# Patient Record
Sex: MALE | Race: WHITE | NOT HISPANIC OR LATINO | Employment: OTHER | URBAN - METROPOLITAN AREA
[De-identification: names, ages, dates, MRNs, and addresses within clinical notes are randomized per-mention and may not be internally consistent; named-entity substitution may affect disease eponyms.]

---

## 2017-11-28 ENCOUNTER — TELEPHONE (OUTPATIENT)
Dept: FAMILY MEDICINE CLINIC | Facility: CLINIC | Age: 82
End: 2017-11-28

## 2017-11-28 ENCOUNTER — OFFICE VISIT (OUTPATIENT)
Dept: FAMILY MEDICINE CLINIC | Facility: CLINIC | Age: 82
End: 2017-11-28

## 2017-11-28 VITALS
BODY MASS INDEX: 28.85 KG/M2 | OXYGEN SATURATION: 98 % | SYSTOLIC BLOOD PRESSURE: 102 MMHG | DIASTOLIC BLOOD PRESSURE: 70 MMHG | WEIGHT: 213 LBS | HEART RATE: 70 BPM | TEMPERATURE: 97.7 F | HEIGHT: 72 IN

## 2017-11-28 DIAGNOSIS — Z95.0 PACEMAKER: ICD-10-CM

## 2017-11-28 DIAGNOSIS — G47.10 EXCESSIVE SLEEPINESS: ICD-10-CM

## 2017-11-28 DIAGNOSIS — R21 RASH: ICD-10-CM

## 2017-11-28 DIAGNOSIS — R53.81 PHYSICAL DEBILITY: Primary | ICD-10-CM

## 2017-11-28 DIAGNOSIS — I25.10 CORONARY ARTERY DISEASE INVOLVING NATIVE HEART WITHOUT ANGINA PECTORIS, UNSPECIFIED VESSEL OR LESION TYPE: ICD-10-CM

## 2017-11-28 LAB — INR PPP: 1.8 (ref 0.9–1.1)

## 2017-11-28 PROCEDURE — 36416 COLLJ CAPILLARY BLOOD SPEC: CPT | Performed by: FAMILY MEDICINE

## 2017-11-28 PROCEDURE — 99204 OFFICE O/P NEW MOD 45 MIN: CPT | Performed by: FAMILY MEDICINE

## 2017-11-28 PROCEDURE — 85610 PROTHROMBIN TIME: CPT | Performed by: FAMILY MEDICINE

## 2017-11-28 RX ORDER — WARFARIN SODIUM 5 MG/1
5 TABLET ORAL
COMMUNITY
End: 2018-06-29 | Stop reason: SDUPTHER

## 2017-11-28 RX ORDER — ACETAMINOPHEN 500 MG
500 TABLET ORAL 2 TIMES DAILY
COMMUNITY
End: 2020-09-14

## 2017-11-28 RX ORDER — VIT A/VIT C/VIT E/ZINC/COPPER 4296-226
1 CAPSULE ORAL DAILY
COMMUNITY
End: 2021-10-04

## 2017-11-28 RX ORDER — PAROXETINE 10 MG/1
10 TABLET, FILM COATED ORAL EVERY MORNING
COMMUNITY
End: 2018-02-05 | Stop reason: SDUPTHER

## 2017-11-28 RX ORDER — ENALAPRIL MALEATE 5 MG/1
5 TABLET ORAL DAILY
COMMUNITY
End: 2018-08-21 | Stop reason: SDUPTHER

## 2017-11-28 NOTE — PROGRESS NOTES
Subjective   Payam Campos is a 86 y.o. male.  Patient has presented to [facility].  He moved to [place] from [place] in October 2017.  His wife Debbie is present in the office and he is agreeable to discuss his care with her He has concerns about a rash and his progressive weakness making it difficult to walk.    Chief Complaint   Patient presents with   • Difficulty Walking     leg weakness, coming on gradually for a year        History of Present Illness    Difficulty walking  This has been an ongoing problem for the past 2-3 years.  It has been progressively worsening.  He was evaluated by neurosurgery in [place] at [facility].  The decision at that time was to pursue physical therapy and avoid surgery.  He has been doing physical therapy he reports for about 3 years.  Despite working with physical therapy he feels that he continues to become weaker.  He walks dependent of a walker and has used a wheelchair one time to leave the [facility] to get to the car to travel back to their [place].  He does not have shortness of breath, chest pain, leg pain although he used to have pain in the legs this has improved with regular Tylenol, it is awake weakness that keeps him from being able to walk further.  He does physical therapy at a facility 3 days per week.  He reports that he only occasionally does the physical therapy at home, independently, on his off days.  He in the family would like to avoid wheelchair requirement.    Rash  The rash was noted by helping hands caretaker from the facility on 5 days prior to arrival.  The rashes on his back and it was itchy but no longer is.  His wife said that they're worse no drainage or pus from the rash.  They just looked like little pink bumps, but since he has scratched them they have scabbed over.  Overall since onset it has improved significantly.  Wife notes that she did notice she did buy the wrong detergent as she usually uses  hypoallergenic.  He also has significantly dry skin especially on the lower legs and she has been scratching.  He has had some minor bleeding from the scratching that stopped easily.    Sleepiness  Wife noted that patient sleeps excessively.  He is able to sleep through the night about 12 hours.  And then he will nap periodically throughout the day.  He is able to stay awake for his physical therapy sessions and while eating, but when just sitting without any event he falls asleep.  Wondering if any of his medications make him sleepy.  He takes his paroxetine at night.    Coronary artery disease, history of myocardial infarction, pacemaker dependent  Pt previously seen by Dr. Del Angel of  cardiology. He has hx of STEMI 2005, had stents placed NSTEMI 2009 with complete heart block. Has a fib, on warfarin. Wife does meds but does not have daily dosing with her, has 5 mg tabs but reports takes different number on different days. Has dual chamber, permanent pacemaker, from [employer]. Rate controlled on metoprolol. Also on ACE-I, ASA. Was on atorvastatin per recommendations for his hyperlipidemia in setting of hx of CAD and MI but when developed leg pain and weakness was discontinued. Pt's leg pain and weakness persisted despite being off of the statin so cardiology advised to resume but pt and his family felt it should not be. The pt does not take a statin and does not want to take a statin out of fear it does effect his legs. Reported he was previously having pain and that is better, weakness continues.    A point of concern for the family is the pt's alcohol use. He drinks [place] bourbon, nightly. He has a 2 oz glass followed by a 1.5 oz glass his wife prepares for him. Pt reported that him wanting two drinks is a point that he and his wife often argue about. The family has concern that he has increased confusion and more difficulty with his walking. He does not think that the drinking is a problem  "or that it gives him more trouble with his thinking or walking.     The following portions of the patient's history were reviewed and updated as appropriate: allergies, current medications, past family history, past medical history, past social history, past surgical history and problem list.          Review of Systems   Constitutional: Positive for activity change. Negative for appetite change, fatigue and unexpected weight change.   HENT: Negative for congestion, postnasal drip, rhinorrhea, sinus pain and sore throat.    Respiratory: Negative for cough and shortness of breath.    Cardiovascular: Negative for chest pain and leg swelling.   Gastrointestinal: Positive for constipation. Negative for abdominal pain, blood in stool and diarrhea.   Genitourinary: Negative for difficulty urinating, dysuria, frequency and urgency.   Musculoskeletal: Positive for gait problem. Negative for back pain, joint swelling and myalgias.   Skin: Positive for rash.   Neurological: Negative for weakness and headaches.   Psychiatric/Behavioral: Positive for sleep disturbance. Negative for dysphoric mood.       Objective   Blood pressure 102/70, pulse 70, temperature 97.7 °F (36.5 °C), height 72\" (182.9 cm), weight 213 lb (96.6 kg), SpO2 98 %.  Physical Exam   Constitutional: He is oriented to person, place, and time. He appears well-nourished. No distress.   Eyes: Conjunctivae are normal. Right eye exhibits no discharge. Left eye exhibits no discharge.   Cardiovascular: Normal rate and regular rhythm.  Exam reveals no gallop and no friction rub.    Murmur heard.  Pulmonary/Chest: Effort normal and breath sounds normal. No respiratory distress. He has no wheezes. He has no rales.   Musculoskeletal: He exhibits no edema or deformity.   Neurological: He is alert and oriented to person, place, and time.   Sitting in wheel chair, can lift legs, lean forward without difficulty.   Skin:   Center of lower back and right shoulder blade with " isolate erythmatous papules with excoriation and overlying dark scab, no drainage or pustules. BLE with significant dryness, desquamation. Excoriation and scabbing on LLE superior pretibial surface.    Psychiatric: He has a normal mood and affect. His behavior is normal.   Vitals reviewed.      Assessment/Plan   Payam was seen today for difficulty walking.    Diagnoses and all orders for this visit:    Physical debility    Rash    Coronary artery disease involving native heart without angina pectoris, unspecified vessel or lesion type  -     POCT INR    Pacemaker  -     Ambulatory Referral to Cardiology    Excessive sleepiness      Debility  Encouraged pt to exercise daily with his recommended PT exercises. Discussed that means on days he does not go to the facility to do formal PT with the therapist. If not, he will likely progress to continue to lose his strength. Importance of using his muscles stressed.    Rash  Appears benign and to be clearing on exam from history. Thought to be contact derm, significantly dry skin on LEs. Encouraged to hydrate skin with emollient over night and lotion throughout the day. Call office if recurs or worsens and will prescribe topical steroid. Resume use of previous laundry detergent.    CAD, AV block, permanent pacemaker  Pt and wife were told they do not need a cardiologist in [place]. I told them should refer for pacemaker interrogation and management at a minimum. Will not restart statin at this time given unclear hx of myalgias from pt report and cardiology notes. Pt desires to avoid statin therapy at this time and understands he has high cholesterol. Cardiology notes reviewed from  from last two years, brought in by pt. History updated according to notes. Of note in 7/2015 visit pacemaker noted to have about 2.5 years of battery life remaining. Referred to cardiology for management of pacemaker, as he is pacemaker dependent. Most recent interrogation from notes was  8/2017: dual chamber Apace 0%, RV pace 100%, 3 episodes Vtachy, large amount of Afib, 96%, longest episode 8.5 days.    A fib  NSR on exam. Rate controlled with beta blocker. On warfarin but not sure of dosing. Sub therapeutic INR at 1.8. Wife to call with dosing so can be adjusted and follow up in one week for repeat INR.    Sleepiness  Encouraged more daytime activity so less sitting, watching TV, sedentary time. Possibly metoprolol causing sleepiness. Still able to sleep overnight. Taking paroxetine at night. No medication changes at this time.    Discussed alcohol consumption with pt and wife. Concerns from family that the alcohol consumption, nightly is effecting his cognition and physical function. Pt disagrees. Discussed significant potential risks with alcohol use including falls, confusion, bleeding but pt did not think these were significant enough to warrant decreasing his alcohol consumption. Pt does not prepare his drinks, wife does. Pt did agree to decrease to one drink nightly instead of two.

## 2017-11-29 ENCOUNTER — TELEPHONE (OUTPATIENT)
Dept: FAMILY MEDICINE CLINIC | Facility: CLINIC | Age: 82
End: 2017-11-29

## 2017-11-29 PROBLEM — I48.20 CHRONIC ATRIAL FIBRILLATION: Status: ACTIVE | Noted: 2017-11-29

## 2017-11-29 PROBLEM — E78.49 OTHER HYPERLIPIDEMIA: Status: ACTIVE | Noted: 2017-11-29

## 2017-11-29 PROBLEM — Z51.81 ANTICOAGULATION GOAL OF INR 2 TO 3: Status: ACTIVE | Noted: 2017-11-29

## 2017-11-29 PROBLEM — Z79.01 ANTICOAGULATION GOAL OF INR 2 TO 3: Status: ACTIVE | Noted: 2017-11-29

## 2017-11-29 PROBLEM — F41.9 ANXIETY: Status: ACTIVE | Noted: 2017-11-29

## 2017-11-29 RX ORDER — METOPROLOL TARTRATE 50 MG/1
75 TABLET, FILM COATED ORAL 2 TIMES DAILY
COMMUNITY
End: 2018-01-09 | Stop reason: DRUGHIGH

## 2017-11-29 NOTE — TELEPHONE ENCOUNTER
Called and spoke with patient's wife Mrs. Lewis.  She is responsible for the patient's medication.  She reported that patient takes 5 mg on Sunday Tuesday Thursday and 2.5 mg on Monday Wednesday Friday and Saturday.  He has 2.5 mg tablets at home.  This has been his consistent dose since Kathleen the middle of October per her memory.  His INR in the office was 1.8 and so dosing was increased for the week by 10%.  He will now take 5 mg on Wednesday also, which is today.  Patient's wife voiced understanding and will make adjustments.  She was able to repeat back the correct dosing schedule over the phone.  They are to present next Tuesday on December 5 for a repeat INR.

## 2017-12-05 ENCOUNTER — TELEPHONE (OUTPATIENT)
Dept: FAMILY MEDICINE CLINIC | Facility: CLINIC | Age: 82
End: 2017-12-05

## 2017-12-05 ENCOUNTER — CLINICAL SUPPORT (OUTPATIENT)
Dept: FAMILY MEDICINE CLINIC | Facility: CLINIC | Age: 82
End: 2017-12-05

## 2017-12-05 DIAGNOSIS — Z79.01 ANTICOAGULATION GOAL OF INR 2 TO 3: ICD-10-CM

## 2017-12-05 DIAGNOSIS — Z51.81 ANTICOAGULATION GOAL OF INR 2 TO 3: ICD-10-CM

## 2017-12-05 LAB — INR PPP: 2.6 (ref 0.9–1.1)

## 2017-12-05 PROCEDURE — 85610 PROTHROMBIN TIME: CPT | Performed by: FAMILY MEDICINE

## 2017-12-05 PROCEDURE — 36416 COLLJ CAPILLARY BLOOD SPEC: CPT | Performed by: FAMILY MEDICINE

## 2017-12-05 NOTE — TELEPHONE ENCOUNTER
Patient's wife called. She says that the patient has severe ear ache and usually has them once a month. She wanted to know what we could give him for that if anything.

## 2017-12-18 ENCOUNTER — OFFICE VISIT (OUTPATIENT)
Dept: FAMILY MEDICINE CLINIC | Facility: CLINIC | Age: 82
End: 2017-12-18

## 2017-12-18 ENCOUNTER — CLINICAL SUPPORT (OUTPATIENT)
Dept: FAMILY MEDICINE CLINIC | Facility: CLINIC | Age: 82
End: 2017-12-18

## 2017-12-18 DIAGNOSIS — H92.02 LEFT EAR PAIN: ICD-10-CM

## 2017-12-18 DIAGNOSIS — I48.20 CHRONIC ATRIAL FIBRILLATION (HCC): Primary | ICD-10-CM

## 2017-12-18 DIAGNOSIS — H61.23 BILATERAL IMPACTED CERUMEN: Primary | ICD-10-CM

## 2017-12-18 LAB — INR PPP: 3 (ref 0.9–1.1)

## 2017-12-18 PROCEDURE — 85610 PROTHROMBIN TIME: CPT | Performed by: FAMILY MEDICINE

## 2017-12-18 PROCEDURE — 36416 COLLJ CAPILLARY BLOOD SPEC: CPT | Performed by: FAMILY MEDICINE

## 2017-12-18 PROCEDURE — 99212 OFFICE O/P EST SF 10 MIN: CPT | Performed by: FAMILY MEDICINE

## 2017-12-18 NOTE — PROGRESS NOTES
Doroteo Campos is a 86 y.o. male.  Patient is here with complaints of increased ear wax and ear pain.    No chief complaint on file.       History of Present Illness  Increased earwax and ear pain  Patient does get a buildup of earwax intermittently.  He does not currently have ear pain but develops predominantly left ear pain every few weeks.  It seems to happen out of nowhere suddenly with pain inside the left ear.  For relief he takes a warm cloth analyze it against his ear and lays down.  His wife and he thinks that he gets a buildup of hard wax in the ears.  They are hoping to prevent these episodes in the future by keeping his ears cleaned out.  He is hard of hearing at baseline.      INR today was 3    Of note patient will establish care with cardiology in [place] in January 2018.  He has a pacemaker.    The following portions of the patient's history were reviewed and updated as appropriate: allergies, current medications and problem list.      Review of Systems   Constitutional: Negative for activity change.   HENT: Positive for ear pain. Negative for ear discharge.        Objective   There were no vitals taken for this visit.  Physical Exam   Constitutional: He appears well-nourished. No distress.   HENT:   Right Ear: External ear normal.   Left Ear: External ear normal.   Exam is following nurse performing removal of cerumen.  The left and right tympanic membranes and canals appear normal.  The membranes are not erythematous or bulging.  The inner ear landmarks are easily visualized.  There is no erythema or desquamation of the canal.   Eyes: Conjunctivae are normal. Right eye exhibits no discharge. Left eye exhibits no discharge.   Pulmonary/Chest: Effort normal. No respiratory distress.   Neurological:   Patient is in wheelchair.   Psychiatric: He has a normal mood and affect. His behavior is normal.   Vitals reviewed.      Assessment/Plan   Diagnoses and all orders for this  visit:    Bilateral impacted cerumen    Left ear pain    Recommended that they apply mineral oil are all with oil every 2-3 days to the ear canals and cover with cotton ball.  After a few hours or overnight with oil patient and his wife can rinse ear canal with warm water either at the sink or with bathing.  If this fails to improve the frequency and severity of his ear pain episodes we discussed that he can be referred to ENT for further evaluation.

## 2018-01-09 ENCOUNTER — OFFICE VISIT (OUTPATIENT)
Dept: CARDIOLOGY | Facility: CLINIC | Age: 83
End: 2018-01-09

## 2018-01-09 ENCOUNTER — CLINICAL SUPPORT NO REQUIREMENTS (OUTPATIENT)
Dept: CARDIOLOGY | Facility: CLINIC | Age: 83
End: 2018-01-09

## 2018-01-09 VITALS
BODY MASS INDEX: 27.46 KG/M2 | DIASTOLIC BLOOD PRESSURE: 80 MMHG | HEART RATE: 70 BPM | SYSTOLIC BLOOD PRESSURE: 128 MMHG | HEIGHT: 74 IN | WEIGHT: 214 LBS

## 2018-01-09 DIAGNOSIS — Z79.01 ANTICOAGULATION GOAL OF INR 2 TO 3: ICD-10-CM

## 2018-01-09 DIAGNOSIS — I25.10 CORONARY ARTERY DISEASE INVOLVING NATIVE HEART WITHOUT ANGINA PECTORIS, UNSPECIFIED VESSEL OR LESION TYPE: Chronic | ICD-10-CM

## 2018-01-09 DIAGNOSIS — Z95.0 PACEMAKER: Chronic | ICD-10-CM

## 2018-01-09 DIAGNOSIS — I48.20 CHRONIC ATRIAL FIBRILLATION (HCC): Primary | Chronic | ICD-10-CM

## 2018-01-09 DIAGNOSIS — I48.20 CHRONIC ATRIAL FIBRILLATION (HCC): Primary | ICD-10-CM

## 2018-01-09 DIAGNOSIS — Z51.81 ANTICOAGULATION GOAL OF INR 2 TO 3: ICD-10-CM

## 2018-01-09 DIAGNOSIS — E78.49 OTHER HYPERLIPIDEMIA: Chronic | ICD-10-CM

## 2018-01-09 PROCEDURE — 99204 OFFICE O/P NEW MOD 45 MIN: CPT | Performed by: INTERNAL MEDICINE

## 2018-01-09 PROCEDURE — 93000 ELECTROCARDIOGRAM COMPLETE: CPT | Performed by: INTERNAL MEDICINE

## 2018-01-09 PROCEDURE — 93280 PM DEVICE PROGR EVAL DUAL: CPT | Performed by: INTERNAL MEDICINE

## 2018-01-09 NOTE — PROGRESS NOTES
Subjective:     Encounter Date:01/09/2018      Patient ID: Payam Campos is a 86 y.o. male.    Chief Complaint:  History of Present Illness    Dear Dr. Walker,    I had the pleasure seeing this patient in the office today for initial evaluation consultation.  I appreciate the opportunity to see him.    He is a pleasant gentleman who previously had been followed by Dr. Khan: [address] [place] [address].  He has a history of chronic atrial fibrillation, sick sinus syndrome, pacemaker in place, as well as CAD with prior STEMI (2006)and stents placed in his LAD.    This pacemaker is a [employer] dual-chamber device.    In 2006 he had an ST segment elevation myocardial infarction and stent placement.  In 2009 he had non-ST segment elevation MI in the setting of complete heart block.  In July 2013 he had a stress Cardiolite performed that showed small defect in the inferolateral wall there was felt to be of very low risk study.  Ejection fraction was 64%.    Patient states he comes in here simply to get established.  He denies any cardiac symptoms.This patient denies any chest pain, pressure, tightness, squeezing, or heartburn.  This patient has not experienced any feeling of palpitations, tachycardia or heart racing and no presyncope or syncope.  This patient denies any shortness of breath at rest or with activity and has not had any wheezing.  This patient has not had any problems with unexplained nausea or vomiting. This patient has not been recently hospitalized for any reason.    Patient has had issues with lower extremity weakness and pain.  It is been concerned that this is due to statin therapy; he has had myopathy with this in the past.  He is currently off all statin therapy.  His wife is with him and she stresses that they want to make sure that they're doing everything they can keep him comfortable and they would prefer to avoid diagnostic testing unless absolutely necessary.    Patient also  drinks 2 drinks of [place] evening, apparently 2.0 ounces and then 1.5 ounces, prepared to his wife.  There is been concerned that he goes of this degree of alcohol intake that this is affecting his mental status.  He has been showing increasing signs of dementia.    The following portions of the patient's history were reviewed and updated as appropriate: allergies, current medications, past family history, past medical history, past social history, past surgical history and problem list.    Past Medical History:   Diagnosis Date   • Abnormal glucose    • Allergic rhinitis    • Anxiety disorder    • Arteriosclerosis of coronary artery    • Ataxia    • Atrial fibrillation    • Atrioventricular block, complete    • Basal cell carcinoma     left temple 11/2002, shoulder 10/2007   • CAD (coronary artery disease)    • Chest pain    • Chronic low back pain    • Conjunctivitis     left eye bacterial infection   • Constipation    • Coronary artery disease involving native heart without angina pectoris    • Depression    • Difficulty walking     leg weakness, coming on gradually for a year   • Earache, left    • Essential (primary) hypertension    • Excessive daytime sleepiness    • Falls    • Gait difficulty    • Heart disease    • High risk medication use    • Hyperlipidemia    • Hypertension    • Hypothyroidism    • Inguinal hernia    • Joint pain, knee    • Labyrinthitis    • Leg pain    • Leg weakness    • Long-term (current) use of anticoagulants    • Lumbar spondylosis    • Myocardial infarction     STEMI, 2006 NSTEMI 2009   • NSTEMI (non-ST elevated myocardial infarction) 2009   • Obesity    • Overweight    • Pacemaker     dual-chamber, [employer]   • Rash    • Skin cancer     right shoulder resected 10/07 and left temple resected 11/02   • Sleep disturbance    • SOB (shortness of breath)    • STEMI (ST elevation myocardial infarction) 2005   • Weakness     progressive, making it difficult to walk        Past Surgical History:   Procedure Laterality Date   • CORONARY ANGIOPLASTY WITH STENT PLACEMENT     • EYE SURGERY Left     tear duct   • PACEMAKER IMPLANTATION         Social History     Social History   • Marital status:      Spouse name: N/A   • Number of children: N/A   • Years of education: N/A     Occupational History   • Not on file.     Social History Main Topics   • Smoking status: Former Smoker   • Smokeless tobacco: Never Used   • Alcohol use Yes      Comment: 4 oz of bourbon nightly   • Drug use: No   • Sexual activity: Not on file     Other Topics Concern   • Not on file     Social History Narrative       Review of Systems   Constitution: Negative for chills, decreased appetite, fever and night sweats.   HENT: Negative for ear discharge, ear pain, hearing loss, nosebleeds and sore throat.    Eyes: Negative for blurred vision, double vision and pain.   Cardiovascular: Negative for cyanosis.   Respiratory: Negative for hemoptysis and sputum production.    Endocrine: Negative for cold intolerance and heat intolerance.   Hematologic/Lymphatic: Negative for adenopathy.   Skin: Negative for dry skin, itching, nail changes, rash and suspicious lesions.   Musculoskeletal: Negative for arthritis, gout, muscle cramps, muscle weakness, myalgias and neck pain.   Gastrointestinal: Negative for anorexia, bowel incontinence, constipation, diarrhea, dysphagia, hematemesis and jaundice.   Genitourinary: Negative for bladder incontinence, dysuria, flank pain, frequency, hematuria and nocturia.   Neurological: Negative for focal weakness, numbness, paresthesias and seizures.   Psychiatric/Behavioral: Negative for altered mental status, hallucinations, hypervigilance, suicidal ideas and thoughts of violence.   Allergic/Immunologic: Negative for persistent infections.         ECG 12 Lead  Date/Time: 1/9/2018 1:35 PM  Performed by: PETER MCCARTHY III  Authorized by: PETER MCCARTHY III   Comparison: compared  "with previous ECG   Similar to previous ECG  Rhythm: sinus rhythm and paced  Rate: normal  Conduction: left bundle branch block  QRS axis: left  Other: no other findings  Clinical impression: abnormal ECG               Objective:     Vitals:    01/09/18 1316 01/09/18 1320   BP: 128/78 128/80   BP Location: Right arm Left arm   Pulse: 70    Weight: 97.1 kg (214 lb)    Height: 188 cm (74\")          Physical Exam   Constitutional: He is oriented to person, place, and time. He appears well-developed and well-nourished. No distress.   HENT:   Head: Normocephalic and atraumatic.   Nose: Nose normal.   Mouth/Throat: Oropharynx is clear and moist.   Eyes: Conjunctivae and EOM are normal. Pupils are equal, round, and reactive to light. Right eye exhibits no discharge. Left eye exhibits no discharge.   Neck: Normal range of motion. Neck supple. No tracheal deviation present. No thyromegaly present.   Cardiovascular: Normal rate, regular rhythm, S1 normal, S2 normal, normal heart sounds and normal pulses.  Exam reveals no S3.    Pulmonary/Chest: Effort normal and breath sounds normal. No stridor. No respiratory distress. He exhibits no tenderness.   Abdominal: Soft. Bowel sounds are normal. He exhibits no distension and no mass. There is no tenderness. There is no rebound and no guarding.   Musculoskeletal: Normal range of motion. He exhibits no tenderness or deformity.   Lymphadenopathy:     He has no cervical adenopathy.   Neurological: He is alert and oriented to person, place, and time. He has normal reflexes.   Skin: Skin is warm and dry. No rash noted. He is not diaphoretic. No erythema.   Psychiatric: He has a normal mood and affect. Thought content normal.       Lab Review:             Performed        Assessment:          Diagnosis Plan   1. Chronic atrial fibrillation  ECG 12 Lead   2. Pacemaker  ECG 12 Lead   3. Anticoagulation goal of INR 2 to 3     4. Coronary artery disease involving native heart without angina " pectoris, unspecified vessel or lesion type  ECG 12 Lead   5. Other hyperlipidemia            Plan:       1. Atrial Fibrillation and Atrial Flutter  Assessment  • The patient has paroxysmal atrial fibrillation  • This is non-valvular in etiology  • The patient's CHADS2-VASc score is 4  • A [id number] score of 2 or more is considered a high risk for a thromboembolic event  • Warfarin prescribed    Plan  • Continue in atrial fibrillation with rate control  • Continue warfarin for antithrombotic therapy, bleeding issues discussed  • Continue beta blocker for rate control    2. Coronary Artery Disease  Assessment  • The patient has no angina    Plan  • Lifestyle modifications discussed include adhering to a heart healthy diet, maintenance of a healthy weight, medication compliance and regular monitoring of cholesterol and blood pressure    Subjective - Objective  • There has been a previous stent procedure using GENA  • Current antiplatelet therapy includes aspirin 81 mg    3.  Sick sinus syndrome-status post pacemaker placement.  History of complete heart block.  We will enroll in our device clinic  4.  Chronic anticoagulation followed by yourself  5.  We will arrange for a one year appointment as well.    Thank you very much for allowing us to participate in the care of this pleasant patient.  Please don't hesitate to call if I can be of assistance in any way.      Current Outpatient Prescriptions:   •  acetaminophen (TYLENOL) 500 MG tablet, Take 500 mg by mouth Every 6 (Six) Hours As Needed for Mild Pain ., Disp: , Rfl:   •  aspirin 81 MG tablet, Take 81 mg by mouth Daily., Disp: , Rfl:   •  docusate sodium (COLACE) 50 MG capsule, Take  by mouth 2 (Two) Times a Day., Disp: , Rfl:   •  enalapril (VASOTEC) 5 MG tablet, Take 5 mg by mouth Daily., Disp: , Rfl:   •  METOPROLOL TARTRATE PO, Take 75 mg by mouth. 1.5 in the am and 1.5 in the pm, Disp: , Rfl:   •  Multiple Vitamins-Minerals (ICAPS) capsule, Take  by mouth.,  Disp: , Rfl:   •  PARoxetine (PAXIL) 10 MG tablet, Take 10 mg by mouth Every Morning., Disp: , Rfl:   •  warfarin (COUMADIN) 5 MG tablet, Take 5 mg by mouth Daily., Disp: , Rfl:

## 2018-01-19 ENCOUNTER — CLINICAL SUPPORT (OUTPATIENT)
Dept: FAMILY MEDICINE CLINIC | Facility: CLINIC | Age: 83
End: 2018-01-19

## 2018-01-19 DIAGNOSIS — I48.20 CHRONIC A-FIB (HCC): Primary | ICD-10-CM

## 2018-01-19 LAB — INR PPP: 2.6 (ref 0.9–1.1)

## 2018-01-19 PROCEDURE — 85610 PROTHROMBIN TIME: CPT | Performed by: FAMILY MEDICINE

## 2018-01-19 PROCEDURE — 36416 COLLJ CAPILLARY BLOOD SPEC: CPT | Performed by: FAMILY MEDICINE

## 2018-01-22 PROBLEM — M62.59 ATROPHY OF MUSCLE OF MULTIPLE SITES: Status: ACTIVE | Noted: 2018-01-22

## 2018-01-22 PROBLEM — Z91.81 RISK FOR FALLS: Status: ACTIVE | Noted: 2018-01-22

## 2018-01-22 PROBLEM — R53.1 WEAKNESS: Status: ACTIVE | Noted: 2018-01-22

## 2018-02-01 ENCOUNTER — TELEPHONE (OUTPATIENT)
Dept: FAMILY MEDICINE CLINIC | Facility: CLINIC | Age: 83
End: 2018-02-01

## 2018-02-01 NOTE — TELEPHONE ENCOUNTER
Spoke with son. Just wanted to let me know the concerns the family has about the state of pt and timing of disability claim. Form will be faxed.

## 2018-02-01 NOTE — TELEPHONE ENCOUNTER
Patient's son called stating that he needs to share information about his father in reference to a disability claim they are getting starting with him insurance company. I let the patient know that he is not listed on his father release of pasha valerio with [employer], son is also a physician and understands with our policy as well as HIPPA he can not ask Dr Walker to release any information on the patient but he will give her information about the patient and this claim

## 2018-02-05 ENCOUNTER — CLINICAL SUPPORT (OUTPATIENT)
Dept: FAMILY MEDICINE CLINIC | Facility: CLINIC | Age: 83
End: 2018-02-05

## 2018-02-05 DIAGNOSIS — I48.91 ATRIAL FIBRILLATION, UNSPECIFIED TYPE (HCC): Primary | ICD-10-CM

## 2018-02-05 DIAGNOSIS — I48.20 CHRONIC ATRIAL FIBRILLATION (HCC): ICD-10-CM

## 2018-02-05 LAB — INR PPP: 2.5 (ref 0.9–1.1)

## 2018-02-05 PROCEDURE — 36416 COLLJ CAPILLARY BLOOD SPEC: CPT | Performed by: FAMILY MEDICINE

## 2018-02-05 PROCEDURE — 85610 PROTHROMBIN TIME: CPT | Performed by: FAMILY MEDICINE

## 2018-02-05 RX ORDER — PAROXETINE 10 MG/1
10 TABLET, FILM COATED ORAL EVERY MORNING
Qty: 60 TABLET | Refills: 2 | Status: SHIPPED | OUTPATIENT
Start: 2018-02-05 | End: 2018-06-29 | Stop reason: SDUPTHER

## 2018-02-22 ENCOUNTER — OFFICE VISIT (OUTPATIENT)
Dept: FAMILY MEDICINE CLINIC | Facility: CLINIC | Age: 83
End: 2018-02-22

## 2018-02-22 VITALS
HEIGHT: 74 IN | HEART RATE: 73 BPM | OXYGEN SATURATION: 93 % | WEIGHT: 214 LBS | DIASTOLIC BLOOD PRESSURE: 74 MMHG | SYSTOLIC BLOOD PRESSURE: 131 MMHG | BODY MASS INDEX: 27.46 KG/M2

## 2018-02-22 DIAGNOSIS — R53.83 FATIGUE, UNSPECIFIED TYPE: ICD-10-CM

## 2018-02-22 DIAGNOSIS — R53.1 GENERALIZED WEAKNESS: Primary | ICD-10-CM

## 2018-02-22 DIAGNOSIS — I48.20 CHRONIC ATRIAL FIBRILLATION (HCC): ICD-10-CM

## 2018-02-22 DIAGNOSIS — Z91.81 RISK FOR FALLS: ICD-10-CM

## 2018-02-22 DIAGNOSIS — R53.81 PHYSICAL DEBILITY: ICD-10-CM

## 2018-02-22 LAB
ALBUMIN SERPL-MCNC: 4 G/DL (ref 3.5–5.2)
ALBUMIN/GLOB SERPL: 1.8 G/DL
ALP SERPL-CCNC: 69 U/L (ref 39–117)
ALT SERPL-CCNC: 25 U/L (ref 1–41)
AST SERPL-CCNC: 25 U/L (ref 1–40)
BASOPHILS # BLD AUTO: 0.02 10*3/MM3 (ref 0–0.2)
BASOPHILS NFR BLD AUTO: 0.4 % (ref 0–1.5)
BILIRUB SERPL-MCNC: 1.8 MG/DL (ref 0.1–1.2)
BUN SERPL-MCNC: 20 MG/DL (ref 8–23)
BUN/CREAT SERPL: 23.5 (ref 7–25)
CALCIUM SERPL-MCNC: 9.5 MG/DL (ref 8.6–10.5)
CHLORIDE SERPL-SCNC: 103 MMOL/L (ref 98–107)
CO2 SERPL-SCNC: 23.7 MMOL/L (ref 22–29)
CREAT SERPL-MCNC: 0.85 MG/DL (ref 0.76–1.27)
EOSINOPHIL # BLD AUTO: 0.18 10*3/MM3 (ref 0–0.7)
EOSINOPHIL NFR BLD AUTO: 3.3 % (ref 0.3–6.2)
ERYTHROCYTE [DISTWIDTH] IN BLOOD BY AUTOMATED COUNT: 13.6 % (ref 11.5–14.5)
GFR SERPLBLD CREATININE-BSD FMLA CKD-EPI: 104 ML/MIN/1.73
GFR SERPLBLD CREATININE-BSD FMLA CKD-EPI: 85 ML/MIN/1.73
GLOBULIN SER CALC-MCNC: 2.2 GM/DL
GLUCOSE SERPL-MCNC: 103 MG/DL (ref 65–99)
HCT VFR BLD AUTO: 46 % (ref 40.4–52.2)
HGB BLD-MCNC: 15.9 G/DL (ref 13.7–17.6)
IMM GRANULOCYTES # BLD: 0 10*3/MM3 (ref 0–0.03)
IMM GRANULOCYTES NFR BLD: 0 % (ref 0–0.5)
INR PPP: 2.2 (ref 2–3)
LYMPHOCYTES # BLD AUTO: 0.99 10*3/MM3 (ref 0.9–4.8)
LYMPHOCYTES NFR BLD AUTO: 18.2 % (ref 19.6–45.3)
MCH RBC QN AUTO: 33.8 PG (ref 27–32.7)
MCHC RBC AUTO-ENTMCNC: 34.6 G/DL (ref 32.6–36.4)
MCV RBC AUTO: 97.7 FL (ref 79.8–96.2)
MONOCYTES # BLD AUTO: 0.5 10*3/MM3 (ref 0.2–1.2)
MONOCYTES NFR BLD AUTO: 9.2 % (ref 5–12)
NEUTROPHILS # BLD AUTO: 3.75 10*3/MM3 (ref 1.9–8.1)
NEUTROPHILS NFR BLD AUTO: 68.9 % (ref 42.7–76)
PLATELET # BLD AUTO: 132 10*3/MM3 (ref 140–500)
POTASSIUM SERPL-SCNC: 4.3 MMOL/L (ref 3.5–5.2)
PROT SERPL-MCNC: 6.2 G/DL (ref 6–8.5)
RBC # BLD AUTO: 4.71 10*6/MM3 (ref 4.6–6)
SODIUM SERPL-SCNC: 141 MMOL/L (ref 136–145)
TSH SERPL DL<=0.005 MIU/L-ACNC: 1.4 MIU/ML (ref 0.27–4.2)
VIT B12 SERPL-MCNC: 344 PG/ML (ref 211–946)
WBC # BLD AUTO: 5.44 10*3/MM3 (ref 4.5–10.7)

## 2018-02-22 PROCEDURE — 36416 COLLJ CAPILLARY BLOOD SPEC: CPT | Performed by: FAMILY MEDICINE

## 2018-02-22 PROCEDURE — 99214 OFFICE O/P EST MOD 30 MIN: CPT | Performed by: FAMILY MEDICINE

## 2018-02-22 PROCEDURE — 85610 PROTHROMBIN TIME: CPT | Performed by: FAMILY MEDICINE

## 2018-02-22 NOTE — PROGRESS NOTES
Subjective   Payam Campos is a 86 y.o. male.     Chief Complaint   Patient presents with   • Extremity Weakness        History of Present Illness  Weakness   Here for leg weakness. Feels like it has gotten worse. Arms feel a little weaker too. Not having pain, no symptoms of acute illness. Eating and drinking well. Feels like the worsened weakening happened suddenly. Likes going to PT and the therapists, then one day a few weeks ago said he did not want to go, surprising, just went two days ago, concerned for change. His wife says over the last few weeks notable change in strength. Gait less strong, he may fall with any step. She thinks he is going to need a wheel chair to get to the car and evening meals. Feels he needs more help and is at more risk now. He needs assistance during this small distances. Around home does well with the short distance. Bed to living room or bathroom. The car to the dining room is a problem. Sometimes taking breaks to get to cafeteria. Feels like at every last night used wheel chair to the dining room and went well. Thinks he lost weight but he is eating well. Doing PT twice weekly. He would like to increase to 3 times weekly.    A fib  INR today on [employer].     The following portions of the patient's history were reviewed and updated as appropriate: allergies, current medications and problem list.     Review of Systems   Constitutional: Positive for activity change. Negative for appetite change and unexpected weight change.        Thought weight loss but scale here shows stable. Says around time of move he was 220 lb.   HENT: Negative for congestion and rhinorrhea.    Respiratory: Negative for cough and shortness of breath.    Gastrointestinal: Negative for abdominal pain, diarrhea, nausea and vomiting.   Musculoskeletal: Positive for gait problem. Negative for arthralgias and myalgias.   Neurological: Positive for weakness. Negative for numbness.   Psychiatric/Behavioral:  "Negative for dysphoric mood and sleep disturbance.       Objective   Blood pressure 131/74, pulse 73, height 188 cm (74\"), weight 97.1 kg (214 lb), SpO2 93 %.  Physical Exam   Constitutional: He appears well-nourished. No distress.   Eyes: Conjunctivae are normal. No scleral icterus.   Pulmonary/Chest: Effort normal. No respiratory distress.   Musculoskeletal: He exhibits tenderness. He exhibits no edema or deformity.   Neurological: He is alert.   Reduced muscle bulk of upper legs, mild tenderness to palpation over bilateral lower legs. No knee tenderness. Stood independently from wheel chair for about 20-30 seconds before feeling the need to sit from legs getting weak. Lifts and holds flexed legs easily, 5/5 LE strength bilaterally. 5/5  and BUE strength.   Psychiatric: He has a normal mood and affect. His behavior is normal.   Vitals reviewed.      Assessment/Plan   Payam was seen today for extremity weakness.    Diagnoses and all orders for this visit:    Generalized weakness  -     TSH Rfx On Abnormal To Free T4  -     Comprehensive Metabolic Panel  -     CBC & Differential  -     Vitamin B12    Risk for falls  -     Vitamin B12    Physical debility  -     TSH Rfx On Abnormal To Free T4  -     Comprehensive Metabolic Panel  -     Vitamin B12    Fatigue, unspecified type  -     TSH Rfx On Abnormal To Free T4  -     Comprehensive Metabolic Panel  -     CBC & Differential  -     Vitamin B12    will send script for wheelchair to [facility] per request  Needs current labs especially given described sudden change in his weakness.    Encouraged pt to continue PT, will increase to 3 times weekly    Encouraged pt and wife to do the PT exercises at home on off days from the gym. They are agreeable. Pt reported needing the wheelchair and the abrupt weakness got his attention and wants to be better about his exercises.            "

## 2018-03-01 ENCOUNTER — TELEPHONE (OUTPATIENT)
Dept: FAMILY MEDICINE CLINIC | Facility: CLINIC | Age: 83
End: 2018-03-01

## 2018-03-21 ENCOUNTER — TELEPHONE (OUTPATIENT)
Dept: FAMILY MEDICINE CLINIC | Facility: CLINIC | Age: 83
End: 2018-03-21

## 2018-03-21 NOTE — TELEPHONE ENCOUNTER
Spoke with patient's wife about the patient's labs as well as suggetions per Dr Walker for his B12

## 2018-03-27 ENCOUNTER — CLINICAL SUPPORT (OUTPATIENT)
Dept: FAMILY MEDICINE CLINIC | Facility: CLINIC | Age: 83
End: 2018-03-27

## 2018-03-27 DIAGNOSIS — I48.91 ATRIAL FIBRILLATION, UNSPECIFIED TYPE (HCC): Primary | ICD-10-CM

## 2018-03-27 LAB — INR PPP: 2.4 (ref 0.9–1.1)

## 2018-03-27 PROCEDURE — 85610 PROTHROMBIN TIME: CPT | Performed by: FAMILY MEDICINE

## 2018-03-27 PROCEDURE — 36416 COLLJ CAPILLARY BLOOD SPEC: CPT | Performed by: FAMILY MEDICINE

## 2018-04-12 ENCOUNTER — CLINICAL SUPPORT NO REQUIREMENTS (OUTPATIENT)
Dept: CARDIOLOGY | Facility: CLINIC | Age: 83
End: 2018-04-12

## 2018-04-12 DIAGNOSIS — I48.20 CHRONIC ATRIAL FIBRILLATION (HCC): Primary | ICD-10-CM

## 2018-04-12 PROCEDURE — 93293 PM PHONE R-STRIP DEVICE EVAL: CPT | Performed by: INTERNAL MEDICINE

## 2018-04-27 ENCOUNTER — CLINICAL SUPPORT (OUTPATIENT)
Dept: FAMILY MEDICINE CLINIC | Facility: CLINIC | Age: 83
End: 2018-04-27

## 2018-04-27 DIAGNOSIS — I48.20 CHRONIC ATRIAL FIBRILLATION (HCC): Primary | ICD-10-CM

## 2018-04-27 LAB — INR PPP: 2.9 (ref 2–3)

## 2018-04-27 PROCEDURE — 85610 PROTHROMBIN TIME: CPT | Performed by: FAMILY MEDICINE

## 2018-04-27 PROCEDURE — 36416 COLLJ CAPILLARY BLOOD SPEC: CPT | Performed by: FAMILY MEDICINE

## 2018-05-25 ENCOUNTER — TELEPHONE (OUTPATIENT)
Dept: FAMILY MEDICINE CLINIC | Facility: CLINIC | Age: 83
End: 2018-05-25

## 2018-05-25 NOTE — TELEPHONE ENCOUNTER
Patient's wife called back with patient medication schedule Sun, Tue, Wed,Thurs he takes 5mg all other days he takes 2.5Mg his INR was 3.2 today

## 2018-05-25 NOTE — TELEPHONE ENCOUNTER
Have patient take 2.5 mg Monday, Wednesday, Friday and Saturday  Continue 5 mg on Sun, T, Th  Repeat in 2 weeks

## 2018-06-13 ENCOUNTER — TELEPHONE (OUTPATIENT)
Dept: FAMILY MEDICINE CLINIC | Facility: CLINIC | Age: 83
End: 2018-06-13

## 2018-06-13 ENCOUNTER — CLINICAL SUPPORT (OUTPATIENT)
Dept: FAMILY MEDICINE CLINIC | Facility: CLINIC | Age: 83
End: 2018-06-13

## 2018-06-13 DIAGNOSIS — I48.20 CHRONIC ATRIAL FIBRILLATION (HCC): Primary | ICD-10-CM

## 2018-06-13 LAB — INR PPP: 1.9 (ref 0.9–1.1)

## 2018-06-13 PROCEDURE — 85610 PROTHROMBIN TIME: CPT | Performed by: FAMILY MEDICINE

## 2018-06-13 PROCEDURE — 36416 COLLJ CAPILLARY BLOOD SPEC: CPT | Performed by: FAMILY MEDICINE

## 2018-06-13 NOTE — TELEPHONE ENCOUNTER
Pt notified that he need to take 5mg today instead 2.5mg by dr olguin   He was here earlier for inr check

## 2018-06-29 RX ORDER — PAROXETINE 10 MG/1
10 TABLET, FILM COATED ORAL EVERY MORNING
Qty: 90 TABLET | Refills: 1 | Status: SHIPPED | OUTPATIENT
Start: 2018-06-29 | End: 2018-08-31

## 2018-06-29 RX ORDER — WARFARIN SODIUM 5 MG/1
5 TABLET ORAL
Qty: 90 TABLET | Refills: 1 | Status: SHIPPED | OUTPATIENT
Start: 2018-06-29 | End: 2018-07-11 | Stop reason: SDUPTHER

## 2018-07-11 RX ORDER — WARFARIN SODIUM 2.5 MG/1
2.5 TABLET ORAL
Qty: 14 TABLET | Refills: 0 | Status: ON HOLD | OUTPATIENT
Start: 2018-07-11 | End: 2018-11-05 | Stop reason: ALTCHOICE

## 2018-07-12 ENCOUNTER — CLINICAL SUPPORT NO REQUIREMENTS (OUTPATIENT)
Dept: CARDIOLOGY | Facility: CLINIC | Age: 83
End: 2018-07-12

## 2018-07-12 DIAGNOSIS — I44.2 COMPLETE HEART BLOCK (HCC): Primary | ICD-10-CM

## 2018-07-13 ENCOUNTER — CLINICAL SUPPORT (OUTPATIENT)
Dept: FAMILY MEDICINE CLINIC | Facility: CLINIC | Age: 83
End: 2018-07-13

## 2018-07-13 DIAGNOSIS — E53.8 B12 DEFICIENCY: Primary | ICD-10-CM

## 2018-07-13 DIAGNOSIS — N28.9 KIDNEY FUNCTION ABNORMAL: ICD-10-CM

## 2018-07-13 DIAGNOSIS — Z79.01 ANTICOAGULANT LONG-TERM USE: Primary | ICD-10-CM

## 2018-07-13 LAB — INR PPP: 3.3 (ref 2–3)

## 2018-07-13 PROCEDURE — 85610 PROTHROMBIN TIME: CPT | Performed by: FAMILY MEDICINE

## 2018-07-13 PROCEDURE — 36416 COLLJ CAPILLARY BLOOD SPEC: CPT | Performed by: FAMILY MEDICINE

## 2018-07-13 PROCEDURE — 93793 ANTICOAG MGMT PT WARFARIN: CPT | Performed by: FAMILY MEDICINE

## 2018-07-14 LAB
BUN SERPL-MCNC: 21 MG/DL (ref 8–23)
BUN/CREAT SERPL: 22.8 (ref 7–25)
CALCIUM SERPL-MCNC: 9.3 MG/DL (ref 8.6–10.5)
CHLORIDE SERPL-SCNC: 105 MMOL/L (ref 98–107)
CO2 SERPL-SCNC: 25.1 MMOL/L (ref 22–29)
CREAT SERPL-MCNC: 0.92 MG/DL (ref 0.76–1.27)
GLUCOSE SERPL-MCNC: 90 MG/DL (ref 65–99)
POTASSIUM SERPL-SCNC: 4 MMOL/L (ref 3.5–5.2)
SODIUM SERPL-SCNC: 145 MMOL/L (ref 136–145)
VIT B12 SERPL-MCNC: 740 PG/ML (ref 211–946)

## 2018-07-17 ENCOUNTER — TELEPHONE (OUTPATIENT)
Dept: FAMILY MEDICINE CLINIC | Facility: CLINIC | Age: 83
End: 2018-07-17

## 2018-07-20 ENCOUNTER — CLINICAL SUPPORT (OUTPATIENT)
Dept: FAMILY MEDICINE CLINIC | Facility: CLINIC | Age: 83
End: 2018-07-20

## 2018-07-20 DIAGNOSIS — I48.20 CHRONIC ATRIAL FIBRILLATION (HCC): Primary | ICD-10-CM

## 2018-07-20 LAB — INR PPP: 2.9 (ref 0.9–1.1)

## 2018-07-20 PROCEDURE — 36416 COLLJ CAPILLARY BLOOD SPEC: CPT | Performed by: FAMILY MEDICINE

## 2018-07-20 PROCEDURE — 85610 PROTHROMBIN TIME: CPT | Performed by: FAMILY MEDICINE

## 2018-07-20 PROCEDURE — 93793 ANTICOAG MGMT PT WARFARIN: CPT | Performed by: FAMILY MEDICINE

## 2018-07-27 RX ORDER — METOPROLOL TARTRATE 50 MG/1
75 TABLET, FILM COATED ORAL EVERY 12 HOURS SCHEDULED
Qty: 270 TABLET | Refills: 1 | Status: SHIPPED | OUTPATIENT
Start: 2018-07-27 | End: 2019-01-10 | Stop reason: SDUPTHER

## 2018-08-20 ENCOUNTER — CLINICAL SUPPORT (OUTPATIENT)
Dept: FAMILY MEDICINE CLINIC | Facility: CLINIC | Age: 83
End: 2018-08-20

## 2018-08-20 DIAGNOSIS — I48.20 CHRONIC ATRIAL FIBRILLATION (HCC): Primary | Chronic | ICD-10-CM

## 2018-08-20 LAB — INR PPP: 2.8 (ref 0.9–1.1)

## 2018-08-20 PROCEDURE — 36416 COLLJ CAPILLARY BLOOD SPEC: CPT | Performed by: FAMILY MEDICINE

## 2018-08-20 PROCEDURE — 85610 PROTHROMBIN TIME: CPT | Performed by: FAMILY MEDICINE

## 2018-08-21 ENCOUNTER — TELEPHONE (OUTPATIENT)
Dept: FAMILY MEDICINE CLINIC | Facility: CLINIC | Age: 83
End: 2018-08-21

## 2018-08-21 RX ORDER — ENALAPRIL MALEATE 5 MG/1
5 TABLET ORAL DAILY
Qty: 90 TABLET | Refills: 1 | Status: SHIPPED | OUTPATIENT
Start: 2018-08-21 | End: 2018-10-23 | Stop reason: SDUPTHER

## 2018-08-21 NOTE — TELEPHONE ENCOUNTER
Physical therapist called stating that the patient's heart beat was irregular today while there, also stated that the patient has been really tired and weak his last few sessions. Therapist would like to be advised on what they should do as well as what the patient should do

## 2018-08-22 ENCOUNTER — CLINICAL SUPPORT NO REQUIREMENTS (OUTPATIENT)
Dept: CARDIOLOGY | Facility: CLINIC | Age: 83
End: 2018-08-22

## 2018-08-22 DIAGNOSIS — I48.20 CHRONIC ATRIAL FIBRILLATION (HCC): ICD-10-CM

## 2018-08-22 DIAGNOSIS — I44.2 COMPLETE HEART BLOCK (HCC): Primary | ICD-10-CM

## 2018-08-22 PROCEDURE — 93280 PM DEVICE PROGR EVAL DUAL: CPT | Performed by: INTERNAL MEDICINE

## 2018-08-22 NOTE — TELEPHONE ENCOUNTER
Can we please try to move up his next appointment to next week?  I think we're seeing him 10/5 but given his new symptoms let's try to get him in sooner.  Thank you so much.

## 2018-08-23 ENCOUNTER — TELEPHONE (OUTPATIENT)
Dept: FAMILY MEDICINE CLINIC | Facility: CLINIC | Age: 83
End: 2018-08-23

## 2018-08-23 NOTE — TELEPHONE ENCOUNTER
Left a detailed message for patient to return my call to try to get them in tomorrow with one of our NP

## 2018-08-29 ENCOUNTER — TELEPHONE (OUTPATIENT)
Dept: FAMILY MEDICINE CLINIC | Facility: CLINIC | Age: 83
End: 2018-08-29

## 2018-08-29 NOTE — TELEPHONE ENCOUNTER
Patient's wife called stating that his physical therapist is concerned with the patient when he comes to his sessions, he is very quiet and his heart rate will go from 62 up to116 and his oxygen  levels  goes from 97 down to 91. Not sure what they should do right now.

## 2018-08-29 NOTE — TELEPHONE ENCOUNTER
Thanks for working on getting him scheduled for a sooner appointment again.  If he has to see another provider with make it so that appointment is at least 30 minutes.  Also let me know who and when and I will touch base with them prior to his appointment.  Thank you.

## 2018-08-31 ENCOUNTER — OFFICE VISIT (OUTPATIENT)
Dept: FAMILY MEDICINE CLINIC | Facility: CLINIC | Age: 83
End: 2018-08-31

## 2018-08-31 VITALS
BODY MASS INDEX: 26.05 KG/M2 | HEART RATE: 79 BPM | OXYGEN SATURATION: 99 % | SYSTOLIC BLOOD PRESSURE: 118 MMHG | HEIGHT: 74 IN | DIASTOLIC BLOOD PRESSURE: 70 MMHG | WEIGHT: 203 LBS

## 2018-08-31 DIAGNOSIS — F32.0 MILD SINGLE CURRENT EPISODE OF MAJOR DEPRESSIVE DISORDER (HCC): Primary | ICD-10-CM

## 2018-08-31 DIAGNOSIS — R53.1 WEAKNESS: ICD-10-CM

## 2018-08-31 DIAGNOSIS — R53.81 PHYSICAL DEBILITY: ICD-10-CM

## 2018-08-31 PROCEDURE — 99214 OFFICE O/P EST MOD 30 MIN: CPT | Performed by: FAMILY MEDICINE

## 2018-08-31 RX ORDER — SERTRALINE HYDROCHLORIDE 25 MG/1
25 TABLET, FILM COATED ORAL DAILY
Qty: 30 TABLET | Refills: 0 | Status: SHIPPED | OUTPATIENT
Start: 2018-08-31 | End: 2018-09-28 | Stop reason: SDUPTHER

## 2018-08-31 RX ORDER — WARFARIN SODIUM 5 MG/1
5 TABLET ORAL DAILY
COMMUNITY
Start: 2018-07-14 | End: 2018-11-20

## 2018-08-31 RX ORDER — POLYETHYLENE GLYCOL 3350 17 G/17G
17 POWDER, FOR SOLUTION ORAL DAILY
Qty: 100 EACH | Refills: 0 | Status: SHIPPED | OUTPATIENT
Start: 2018-08-31 | End: 2019-07-22 | Stop reason: SDUPTHER

## 2018-09-26 ENCOUNTER — CLINICAL SUPPORT (OUTPATIENT)
Dept: FAMILY MEDICINE CLINIC | Facility: CLINIC | Age: 83
End: 2018-09-26

## 2018-09-26 DIAGNOSIS — I48.20 CHRONIC ATRIAL FIBRILLATION (HCC): Chronic | ICD-10-CM

## 2018-09-26 LAB — INR PPP: 4.3 (ref 0.9–1.1)

## 2018-09-26 PROCEDURE — 90662 IIV NO PRSV INCREASED AG IM: CPT | Performed by: FAMILY MEDICINE

## 2018-09-26 PROCEDURE — G0008 ADMIN INFLUENZA VIRUS VAC: HCPCS | Performed by: FAMILY MEDICINE

## 2018-09-26 PROCEDURE — 85610 PROTHROMBIN TIME: CPT | Performed by: FAMILY MEDICINE

## 2018-09-26 PROCEDURE — 36416 COLLJ CAPILLARY BLOOD SPEC: CPT | Performed by: FAMILY MEDICINE

## 2018-09-27 ENCOUNTER — CLINICAL SUPPORT NO REQUIREMENTS (OUTPATIENT)
Dept: CARDIOLOGY | Facility: CLINIC | Age: 83
End: 2018-09-27

## 2018-09-27 DIAGNOSIS — I49.5 SICK SINUS SYNDROME (HCC): Primary | ICD-10-CM

## 2018-09-28 RX ORDER — SERTRALINE HYDROCHLORIDE 25 MG/1
25 TABLET, FILM COATED ORAL DAILY
Qty: 14 TABLET | Refills: 0 | Status: SHIPPED | OUTPATIENT
Start: 2018-09-28 | End: 2019-01-11 | Stop reason: ALTCHOICE

## 2018-09-28 RX ORDER — SERTRALINE HYDROCHLORIDE 25 MG/1
25 TABLET, FILM COATED ORAL DAILY
Qty: 90 TABLET | Refills: 1 | Status: SHIPPED | OUTPATIENT
Start: 2018-09-28 | End: 2018-09-28 | Stop reason: SDUPTHER

## 2018-10-05 ENCOUNTER — OFFICE VISIT (OUTPATIENT)
Dept: FAMILY MEDICINE CLINIC | Facility: CLINIC | Age: 83
End: 2018-10-05

## 2018-10-05 VITALS
SYSTOLIC BLOOD PRESSURE: 122 MMHG | DIASTOLIC BLOOD PRESSURE: 72 MMHG | HEART RATE: 99 BPM | WEIGHT: 203 LBS | HEIGHT: 74 IN | BODY MASS INDEX: 26.05 KG/M2 | OXYGEN SATURATION: 99 %

## 2018-10-05 DIAGNOSIS — R00.0 TACHYCARDIA: ICD-10-CM

## 2018-10-05 DIAGNOSIS — Z79.01 ANTICOAGULATION GOAL OF INR 2 TO 3: Primary | Chronic | ICD-10-CM

## 2018-10-05 DIAGNOSIS — Z51.81 ANTICOAGULATION GOAL OF INR 2 TO 3: Primary | Chronic | ICD-10-CM

## 2018-10-05 DIAGNOSIS — R45.89 DEPRESSED MOOD: ICD-10-CM

## 2018-10-05 LAB — INR PPP: 2.6 (ref 0.9–1.1)

## 2018-10-05 PROCEDURE — 99214 OFFICE O/P EST MOD 30 MIN: CPT | Performed by: FAMILY MEDICINE

## 2018-10-05 PROCEDURE — 85610 PROTHROMBIN TIME: CPT | Performed by: FAMILY MEDICINE

## 2018-10-05 PROCEDURE — 36416 COLLJ CAPILLARY BLOOD SPEC: CPT | Performed by: FAMILY MEDICINE

## 2018-10-05 NOTE — PROGRESS NOTES
"Doroteo Campos is a 87 y.o. male.     Chief Complaint   Patient presents with   • Follow-up     on new medication         History of Present Illness  Depression with medication change  D/c paxil and started sertraline. Still feeling tired a lot. Appetite is fair having two meals per day. Always eats everything on his plate. He says he enjoys PT. Had visit from son this last week and reported he enjoyed. His weight is stable. He is taking milk shakes and enjoying these. Per wife thinks he has had a boost in his mood but still getting up early and then back to sleep. Showing more interest in people again and conversations. He takes sertraline at night.     Tachycardia   New problem to me. Elevated heart rate with PT, go up from 90 to 106 per wife. When this happens they slow down the activity and then can resume. He has pacemaker in place hx of sick sinus syndrome and MI with stenting, a fib. Pt reports that he has no chest pain at PT, no SOB, no palpitations.    Anticoagulation  5 mg TT, 2.5 mg all the other days. Has been elevated last couple of weeks and adjustments last week to this dosing.    The following portions of the patient's history were reviewed and updated as appropriate: allergies, current medications and problem list.    Review of Systems   Constitutional: Positive for activity change. Negative for appetite change and unexpected weight change.        Weight is unchanged.    Respiratory: Negative for shortness of breath.    Cardiovascular: Negative for chest pain and palpitations.   Psychiatric/Behavioral: Positive for sleep disturbance. Negative for dysphoric mood.       Objective   Blood pressure 122/72, pulse 99, height 188 cm (74\"), weight 92.1 kg (203 lb), SpO2 99 %.  Physical Exam   Constitutional: He is oriented to person, place, and time. He appears well-nourished. No distress.   Eyes: Conjunctivae are normal. Right eye exhibits no discharge. Left eye exhibits no discharge. " No scleral icterus.   Cardiovascular: Normal rate, regular rhythm, normal heart sounds and intact distal pulses.  Exam reveals no gallop and no friction rub.    No murmur heard.  Pulmonary/Chest: Effort normal. No respiratory distress.   Musculoskeletal: He exhibits no edema.   Neurological: He is alert and oriented to person, place, and time.   Psychiatric: He has a normal mood and affect. His behavior is normal.   Has some episodes of dozing off but always engaged when spoken too. Asked many questions regarding his sleepiness and wife's condition. Affect bright today, joking and making funny faces.   Vitals reviewed.      Assessment/Plan   Payam was seen today for follow-up.    Diagnoses and all orders for this visit:    Anticoagulation goal of INR 2 to 3  -     POCT INR  In good range today at 2.6.  We will continue the 5 mg on Tuesday and Thursday and 2.5 mg all the other day.  Depressed mood  This has improved with discontinuing the Paxil and transitioning to Zoloft.  Patient behaving differently in the office and seems to have a brighter mood.  Wife comments that things have improved and she's noticed changes since the medication transition.  Will continue same dose of Zoloft at this time.  Tachycardia  I have reviewed physical therapy documentation from his Center and there is no mention of changes in his vital signs during exercise.  Most recent form is from 6/2018. Will request more information and updated information from PT. I will also contact pt's cardiologist Dr. Rajan Borden to make aware.

## 2018-10-17 ENCOUNTER — TELEPHONE (OUTPATIENT)
Dept: FAMILY MEDICINE CLINIC | Facility: CLINIC | Age: 83
End: 2018-10-17

## 2018-10-17 NOTE — TELEPHONE ENCOUNTER
Nurse from [facility] called with the vitals on the patient. Nurse stated that the patient will sometimes have his b/p check and O2 stats, and his heart rate is checked while walking and it will range from  only when taking 15-20 steps. Patient feels tired and has SOB after taking those steps. When the patient is having the jad numbers on his heart rate it is taken manually to be accurate.

## 2018-10-23 RX ORDER — ENALAPRIL MALEATE 5 MG/1
5 TABLET ORAL DAILY
Qty: 90 TABLET | Refills: 1 | Status: SHIPPED | OUTPATIENT
Start: 2018-10-23 | End: 2018-10-31 | Stop reason: SDUPTHER

## 2018-10-25 ENCOUNTER — CLINICAL SUPPORT NO REQUIREMENTS (OUTPATIENT)
Dept: CARDIOLOGY | Facility: CLINIC | Age: 83
End: 2018-10-25

## 2018-10-25 ENCOUNTER — TELEPHONE (OUTPATIENT)
Dept: CARDIOLOGY | Facility: CLINIC | Age: 83
End: 2018-10-25

## 2018-10-25 DIAGNOSIS — I49.5 SICK SINUS SYNDROME (HCC): Primary | ICD-10-CM

## 2018-10-25 DIAGNOSIS — I45.9 HEART BLOCK: Primary | ICD-10-CM

## 2018-10-25 PROCEDURE — 93293 PM PHONE R-STRIP DEVICE EVAL: CPT | Performed by: INTERNAL MEDICINE

## 2018-10-26 PROBLEM — I45.9 HEART BLOCK: Status: ACTIVE | Noted: 2018-10-26

## 2018-10-31 ENCOUNTER — TELEPHONE (OUTPATIENT)
Dept: FAMILY MEDICINE CLINIC | Facility: CLINIC | Age: 83
End: 2018-10-31

## 2018-10-31 RX ORDER — ENALAPRIL MALEATE 5 MG/1
5 TABLET ORAL DAILY
Qty: 7 TABLET | Refills: 0 | Status: SHIPPED | OUTPATIENT
Start: 2018-10-31 | End: 2019-01-22 | Stop reason: SDUPTHER

## 2018-10-31 NOTE — TELEPHONE ENCOUNTER
Spoke with wife. Says pt is out of is enalipril starting tomorrow. Was ordered by the office 10/23/18 but not yet received by pt. Also says that pt has been taking 1.5 tablets daily. Wife with bottle of this medication at home from 2016 that says to take it 1.5 daily. All scripts in the system say 1 daily. Pt was sent 1 week worth to local pharmacy to take one daily, BP will be measured at PT with this change and they will call if BP is elevated. We will notify mail pharmacy of this prescription being due as a result of pt not taking as prescribed.

## 2018-10-31 NOTE — TELEPHONE ENCOUNTER
Please call the mail pharmacy about the early refill. Pt is out. Was taking 1.5 tablets daily instead of 1 tablet daily. Will keep script as 1 tab daily but is out and needs to be sent ASAP. Thanks.

## 2018-11-01 ENCOUNTER — TELEPHONE (OUTPATIENT)
Dept: FAMILY MEDICINE CLINIC | Facility: CLINIC | Age: 83
End: 2018-11-01

## 2018-11-01 ENCOUNTER — LAB (OUTPATIENT)
Dept: LAB | Facility: HOSPITAL | Age: 83
End: 2018-11-01
Attending: INTERNAL MEDICINE

## 2018-11-01 ENCOUNTER — TRANSCRIBE ORDERS (OUTPATIENT)
Dept: ADMINISTRATIVE | Facility: HOSPITAL | Age: 83
End: 2018-11-01

## 2018-11-01 DIAGNOSIS — Z01.810 PRE-OPERATIVE CARDIOVASCULAR EXAMINATION: Primary | ICD-10-CM

## 2018-11-01 DIAGNOSIS — Z98.890: ICD-10-CM

## 2018-11-01 DIAGNOSIS — Z13.6 SCREENING FOR ISCHEMIC HEART DISEASE: ICD-10-CM

## 2018-11-01 DIAGNOSIS — Z01.810 PRE-OPERATIVE CARDIOVASCULAR EXAMINATION: ICD-10-CM

## 2018-11-01 DIAGNOSIS — I45.9 CONDUCTION DISORDER, UNSPECIFIED: ICD-10-CM

## 2018-11-01 LAB
ANION GAP SERPL CALCULATED.3IONS-SCNC: 10 MMOL/L
BASOPHILS # BLD AUTO: 0.01 10*3/MM3 (ref 0–0.2)
BASOPHILS NFR BLD AUTO: 0.2 % (ref 0–1.5)
BUN BLD-MCNC: 19 MG/DL (ref 8–23)
BUN/CREAT SERPL: 16 (ref 7–25)
CALCIUM SPEC-SCNC: 9.7 MG/DL (ref 8.6–10.5)
CHLORIDE SERPL-SCNC: 103 MMOL/L (ref 98–107)
CO2 SERPL-SCNC: 27 MMOL/L (ref 22–29)
CREAT BLD-MCNC: 1.19 MG/DL (ref 0.76–1.27)
DEPRECATED RDW RBC AUTO: 49 FL (ref 37–54)
EOSINOPHIL # BLD AUTO: 0.2 10*3/MM3 (ref 0–0.7)
EOSINOPHIL NFR BLD AUTO: 3.4 % (ref 0.3–6.2)
ERYTHROCYTE [DISTWIDTH] IN BLOOD BY AUTOMATED COUNT: 14 % (ref 11.5–14.5)
GFR SERPL CREATININE-BSD FRML MDRD: 58 ML/MIN/1.73
GLUCOSE BLD-MCNC: 100 MG/DL (ref 65–99)
HCT VFR BLD AUTO: 46.6 % (ref 40.4–52.2)
HGB BLD-MCNC: 16.3 G/DL (ref 13.7–17.6)
IMM GRANULOCYTES # BLD: 0.01 10*3/MM3 (ref 0–0.03)
IMM GRANULOCYTES NFR BLD: 0.2 % (ref 0–0.5)
INR PPP: 2.28 (ref 0.9–1.1)
LYMPHOCYTES # BLD AUTO: 0.86 10*3/MM3 (ref 0.9–4.8)
LYMPHOCYTES NFR BLD AUTO: 14.4 % (ref 19.6–45.3)
MCH RBC QN AUTO: 33.4 PG (ref 27–32.7)
MCHC RBC AUTO-ENTMCNC: 35 G/DL (ref 32.6–36.4)
MCV RBC AUTO: 95.5 FL (ref 79.8–96.2)
MONOCYTES # BLD AUTO: 0.61 10*3/MM3 (ref 0.2–1.2)
MONOCYTES NFR BLD AUTO: 10.2 % (ref 5–12)
NEUTROPHILS # BLD AUTO: 4.28 10*3/MM3 (ref 1.9–8.1)
NEUTROPHILS NFR BLD AUTO: 71.8 % (ref 42.7–76)
PLATELET # BLD AUTO: 164 10*3/MM3 (ref 140–500)
PMV BLD AUTO: 10.3 FL (ref 6–12)
POTASSIUM BLD-SCNC: 4.6 MMOL/L (ref 3.5–5.2)
PROTHROMBIN TIME: 24.8 SECONDS (ref 11.7–14.2)
RBC # BLD AUTO: 4.88 10*6/MM3 (ref 4.6–6)
SODIUM BLD-SCNC: 140 MMOL/L (ref 136–145)
WBC NRBC COR # BLD: 5.96 10*3/MM3 (ref 4.5–10.7)

## 2018-11-01 PROCEDURE — 85610 PROTHROMBIN TIME: CPT

## 2018-11-01 PROCEDURE — 36415 COLL VENOUS BLD VENIPUNCTURE: CPT

## 2018-11-01 PROCEDURE — 80048 BASIC METABOLIC PNL TOTAL CA: CPT

## 2018-11-01 PROCEDURE — 85025 COMPLETE CBC W/AUTO DIFF WBC: CPT

## 2018-11-01 NOTE — TELEPHONE ENCOUNTER
Patient wife called stating that recieved the mail order medication, as well as wanted to know if the patient should be taking a low dose aspirin with his b/p meds and warfarin?

## 2018-11-02 NOTE — TELEPHONE ENCOUNTER
If he is taking aspirin daily he can continue. If he is not taking aspirin I would as cardiology before adding.

## 2018-11-05 ENCOUNTER — CLINICAL SUPPORT NO REQUIREMENTS (OUTPATIENT)
Dept: CARDIOLOGY | Facility: CLINIC | Age: 83
End: 2018-11-05

## 2018-11-05 ENCOUNTER — HOSPITAL ENCOUNTER (OUTPATIENT)
Facility: HOSPITAL | Age: 83
Setting detail: HOSPITAL OUTPATIENT SURGERY
Discharge: HOME | End: 2018-11-05
Attending: INTERNAL MEDICINE | Admitting: INTERNAL MEDICINE

## 2018-11-05 VITALS
HEART RATE: 70 BPM | BODY MASS INDEX: 26.95 KG/M2 | OXYGEN SATURATION: 97 % | SYSTOLIC BLOOD PRESSURE: 137 MMHG | RESPIRATION RATE: 20 BRPM | DIASTOLIC BLOOD PRESSURE: 90 MMHG | TEMPERATURE: 97.8 F | WEIGHT: 210 LBS | HEIGHT: 74 IN

## 2018-11-05 DIAGNOSIS — I45.9 HEART BLOCK: ICD-10-CM

## 2018-11-05 DIAGNOSIS — I44.2 COMPLETE HEART BLOCK (HCC): Primary | ICD-10-CM

## 2018-11-05 DIAGNOSIS — I48.20 CHRONIC ATRIAL FIBRILLATION (HCC): ICD-10-CM

## 2018-11-05 LAB
INR PPP: 1.6 (ref 0.8–1.2)
INR PPP: 1.6 (ref 0.9–1.1)
PROTHROMBIN TIME: 18.6 SECONDS
PROTHROMBIN TIME: 18.6 SECONDS (ref 12.8–15.2)

## 2018-11-05 PROCEDURE — 33228 REMV&REPLC PM GEN DUAL LEAD: CPT | Performed by: INTERNAL MEDICINE

## 2018-11-05 PROCEDURE — 25010000002 MIDAZOLAM PER 1 MG: Performed by: INTERNAL MEDICINE

## 2018-11-05 PROCEDURE — 99152 MOD SED SAME PHYS/QHP 5/>YRS: CPT | Performed by: INTERNAL MEDICINE

## 2018-11-05 PROCEDURE — 85610 PROTHROMBIN TIME: CPT

## 2018-11-05 PROCEDURE — 25010000002 FENTANYL CITRATE (PF) 100 MCG/2ML SOLUTION: Performed by: INTERNAL MEDICINE

## 2018-11-05 PROCEDURE — C1785 PMKR, DUAL, RATE-RESP: HCPCS | Performed by: INTERNAL MEDICINE

## 2018-11-05 PROCEDURE — 25010000003 CEFAZOLIN 1-4 GM/50ML-% SOLUTION: Performed by: INTERNAL MEDICINE

## 2018-11-05 DEVICE — PACEMAKER
Type: IMPLANTABLE DEVICE | Status: FUNCTIONAL
Brand: ACCOLADE™ MRI DR

## 2018-11-05 RX ORDER — OXYCODONE HYDROCHLORIDE AND ACETAMINOPHEN 5; 325 MG/1; MG/1
1-2 TABLET ORAL EVERY 4 HOURS PRN
Qty: 10 TABLET | Refills: 0 | Status: SHIPPED | OUTPATIENT
Start: 2018-11-05 | End: 2019-01-11 | Stop reason: ALTCHOICE

## 2018-11-05 RX ORDER — PROMETHAZINE HYDROCHLORIDE 25 MG/ML
12.5 INJECTION, SOLUTION INTRAMUSCULAR; INTRAVENOUS EVERY 4 HOURS PRN
Status: DISCONTINUED | OUTPATIENT
Start: 2018-11-05 | End: 2018-11-05 | Stop reason: HOSPADM

## 2018-11-05 RX ORDER — MIDAZOLAM HYDROCHLORIDE 1 MG/ML
INJECTION INTRAMUSCULAR; INTRAVENOUS AS NEEDED
Status: DISCONTINUED | OUTPATIENT
Start: 2018-11-05 | End: 2018-11-05 | Stop reason: HOSPADM

## 2018-11-05 RX ORDER — ALPRAZOLAM 0.25 MG/1
0.5 TABLET ORAL 2 TIMES DAILY PRN
Status: DISCONTINUED | OUTPATIENT
Start: 2018-11-05 | End: 2018-11-05 | Stop reason: HOSPADM

## 2018-11-05 RX ORDER — MIDAZOLAM HYDROCHLORIDE 1 MG/ML
1 INJECTION INTRAMUSCULAR; INTRAVENOUS
Status: DISCONTINUED | OUTPATIENT
Start: 2018-11-05 | End: 2018-11-05 | Stop reason: HOSPADM

## 2018-11-05 RX ORDER — LIDOCAINE HYDROCHLORIDE 10 MG/ML
INJECTION, SOLUTION INFILTRATION; PERINEURAL AS NEEDED
Status: DISCONTINUED | OUTPATIENT
Start: 2018-11-05 | End: 2018-11-05 | Stop reason: HOSPADM

## 2018-11-05 RX ORDER — NITROGLYCERIN 0.4 MG/1
0.4 TABLET SUBLINGUAL
Status: DISCONTINUED | OUTPATIENT
Start: 2018-11-05 | End: 2018-11-05 | Stop reason: HOSPADM

## 2018-11-05 RX ORDER — LANOLIN ALCOHOL/MO/W.PET/CERES
1000 CREAM (GRAM) TOPICAL DAILY
COMMUNITY

## 2018-11-05 RX ORDER — CEFAZOLIN SODIUM 1 G/50ML
INJECTION, SOLUTION INTRAVENOUS CONTINUOUS PRN
Status: COMPLETED | OUTPATIENT
Start: 2018-11-05 | End: 2018-11-05

## 2018-11-05 RX ORDER — SODIUM CHLORIDE 0.9 % (FLUSH) 0.9 %
3-10 SYRINGE (ML) INJECTION AS NEEDED
Status: DISCONTINUED | OUTPATIENT
Start: 2018-11-05 | End: 2018-11-05 | Stop reason: HOSPADM

## 2018-11-05 RX ORDER — SODIUM CHLORIDE 0.9 % (FLUSH) 0.9 %
3 SYRINGE (ML) INJECTION EVERY 12 HOURS SCHEDULED
Status: DISCONTINUED | OUTPATIENT
Start: 2018-11-05 | End: 2018-11-05 | Stop reason: HOSPADM

## 2018-11-05 RX ORDER — LIDOCAINE HYDROCHLORIDE 10 MG/ML
0.1 INJECTION, SOLUTION EPIDURAL; INFILTRATION; INTRACAUDAL; PERINEURAL ONCE AS NEEDED
Status: DISCONTINUED | OUTPATIENT
Start: 2018-11-05 | End: 2018-11-05 | Stop reason: HOSPADM

## 2018-11-05 RX ORDER — ACETAMINOPHEN 325 MG/1
650 TABLET ORAL EVERY 4 HOURS PRN
Status: DISCONTINUED | OUTPATIENT
Start: 2018-11-05 | End: 2018-11-05 | Stop reason: HOSPADM

## 2018-11-05 RX ORDER — METOCLOPRAMIDE HYDROCHLORIDE 5 MG/ML
10 INJECTION INTRAMUSCULAR; INTRAVENOUS EVERY 6 HOURS PRN
Status: DISCONTINUED | OUTPATIENT
Start: 2018-11-05 | End: 2018-11-05 | Stop reason: HOSPADM

## 2018-11-05 RX ORDER — SODIUM CHLORIDE 9 MG/ML
75 INJECTION, SOLUTION INTRAVENOUS CONTINUOUS
Status: DISCONTINUED | OUTPATIENT
Start: 2018-11-05 | End: 2018-11-05 | Stop reason: HOSPADM

## 2018-11-05 RX ORDER — HYDROCODONE BITARTRATE AND ACETAMINOPHEN 5; 325 MG/1; MG/1
1 TABLET ORAL EVERY 4 HOURS PRN
Status: DISCONTINUED | OUTPATIENT
Start: 2018-11-05 | End: 2018-11-05 | Stop reason: HOSPADM

## 2018-11-05 RX ORDER — FENTANYL CITRATE 50 UG/ML
INJECTION, SOLUTION INTRAMUSCULAR; INTRAVENOUS AS NEEDED
Status: DISCONTINUED | OUTPATIENT
Start: 2018-11-05 | End: 2018-11-05 | Stop reason: HOSPADM

## 2018-11-05 RX ORDER — ONDANSETRON 2 MG/ML
4 INJECTION INTRAMUSCULAR; INTRAVENOUS EVERY 6 HOURS PRN
Status: DISCONTINUED | OUTPATIENT
Start: 2018-11-05 | End: 2018-11-05 | Stop reason: HOSPADM

## 2018-11-05 RX ADMIN — SODIUM CHLORIDE 75 ML/HR: 9 INJECTION, SOLUTION INTRAVENOUS at 09:01

## 2018-11-05 NOTE — H&P
Progress Notes  Encounter Date: 1/9/2018  Rajan Borden III, MD   Cardiology   Procedure Orders:   1. ECG 12 Lead [[id number]] ordered by Rajan Borden III, MD at 01/09/18 1335   Expand All Collapse All   ManualTemplate  Copied        Subjective:      Encounter Date:01/09/2018        Subjective    Patient ID: Payam Campos is a 86 y.o. male.     Chief Complaint:  History of Present Illness     Dear Dr. Walker,     I had the pleasure seeing this patient in the office today for initial evaluation consultation.  I appreciate the opportunity to see him.     He is a pleasant gentleman who previously had been followed by Dr. Khan: [address] [place] [address].  He has a history of chronic atrial fibrillation, sick sinus syndrome, pacemaker in place, as well as CAD with prior STEMI (2006)and stents placed in his LAD.     This pacemaker is a [employer] dual-chamber device.     In 2006 he had an ST segment elevation myocardial infarction and stent placement.  In 2009 he had non-ST segment elevation MI in the setting of complete heart block.  In July 2013 he had a stress Cardiolite performed that showed small defect in the inferolateral wall there was felt to be of very low risk study.  Ejection fraction was 64%.     Patient states he comes in here simply to get established.  He denies any cardiac symptoms.This patient denies any chest pain, pressure, tightness, squeezing, or heartburn.  This patient has not experienced any feeling of palpitations, tachycardia or heart racing and no presyncope or syncope.  This patient denies any shortness of breath at rest or with activity and has not had any wheezing.  This patient has not had any problems with unexplained nausea or vomiting. This patient has not been recently hospitalized for any reason.     Patient has had issues with lower extremity weakness and pain.  It is been concerned that this is due to statin therapy; he has had myopathy with this in the past.  He is  currently off all statin therapy.  His wife is with him and she stresses that they want to make sure that they're doing everything they can keep him comfortable and they would prefer to avoid diagnostic testing unless absolutely necessary.     Patient also drinks 2 drinks of [place] evening, apparently 2.0 ounces and then 1.5 ounces, prepared to his wife.  There is been concerned that he goes of this degree of alcohol intake that this is affecting his mental status.  He has been showing increasing signs of dementia.     The following portions of the patient's history were reviewed and updated as appropriate: allergies, current medications, past family history, past medical history, past social history, past surgical history and problem list.     Medical History        Past Medical History:   Diagnosis Date   • Abnormal glucose     • Allergic rhinitis     • Anxiety disorder     • Arteriosclerosis of coronary artery     • Ataxia     • Atrial fibrillation     • Atrioventricular block, complete     • Basal cell carcinoma       left temple 11/2002, shoulder 10/2007   • CAD (coronary artery disease)     • Chest pain     • Chronic low back pain     • Conjunctivitis       left eye bacterial infection   • Constipation     • Coronary artery disease involving native heart without angina pectoris     • Depression     • Difficulty walking       leg weakness, coming on gradually for a year   • Earache, left     • Essential (primary) hypertension     • Excessive daytime sleepiness     • Falls     • Gait difficulty     • Heart disease     • High risk medication use     • Hyperlipidemia     • Hypertension     • Hypothyroidism     • Inguinal hernia     • Joint pain, knee     • Labyrinthitis     • Leg pain     • Leg weakness     • Long-term (current) use of anticoagulants     • Lumbar spondylosis     • Myocardial infarction       STEMI, 2006 NSTEMI 2009   • NSTEMI (non-ST elevated myocardial infarction) 2009   • Obesity     •  Overweight     • Pacemaker       dual-chamber, [employer]   • Rash     • Skin cancer       right shoulder resected 10/07 and left temple resected 11/02   • Sleep disturbance     • SOB (shortness of breath)     • STEMI (ST elevation myocardial infarction) 2005   • Weakness       progressive, making it difficult to walk            Surgical History         Past Surgical History:   Procedure Laterality Date   • CORONARY ANGIOPLASTY WITH STENT PLACEMENT       • EYE SURGERY Left       tear duct   • PACEMAKER IMPLANTATION                Social History   Social History            Social History   • Marital status:        Spouse name: N/A   • Number of children: N/A   • Years of education: N/A          Occupational History   • Not on file.              Social History Main Topics    • Smoking status: Former Smoker    • Smokeless tobacco: Never Used    • Alcohol use Yes         Comment: 4 oz of bourbon nightly    • Drug use: No    • Sexual activity: Not on file            Other Topics Concern   • Not on file      Social History Narrative            Review of Systems   Constitution: Negative for chills, decreased appetite, fever and night sweats.   HENT: Negative for ear discharge, ear pain, hearing loss, nosebleeds and sore throat.    Eyes: Negative for blurred vision, double vision and pain.   Cardiovascular: Negative for cyanosis.   Respiratory: Negative for hemoptysis and sputum production.    Endocrine: Negative for cold intolerance and heat intolerance.   Hematologic/Lymphatic: Negative for adenopathy.   Skin: Negative for dry skin, itching, nail changes, rash and suspicious lesions.   Musculoskeletal: Negative for arthritis, gout, muscle cramps, muscle weakness, myalgias and neck pain.   Gastrointestinal: Negative for anorexia, bowel incontinence, constipation, diarrhea, dysphagia, hematemesis and jaundice.   Genitourinary: Negative for bladder incontinence, dysuria, flank pain, frequency, hematuria and  "nocturia.   Neurological: Negative for focal weakness, numbness, paresthesias and seizures.   Psychiatric/Behavioral: Negative for altered mental status, hallucinations, hypervigilance, suicidal ideas and thoughts of violence.   Allergic/Immunologic: Negative for persistent infections.           ECG 12 Lead  Date/Time: 1/9/2018 1:35 PM  Performed by: PETER MCCARTHY III.  Authorized by: PETER MCCARTHY III   Comparison: compared with previous ECG   Similar to previous ECG  Rhythm: sinus rhythm and paced  Rate: normal  Conduction: left bundle branch block  QRS axis: left  Other: no other findings  Clinical impression: abnormal ECG               Objective:      Vitals        Vitals:     01/09/18 1316 01/09/18 1320   BP: 128/78 128/80   BP Location: Right arm Left arm   Pulse: 70     Weight: 97.1 kg (214 lb)     Height: 188 cm (74\")                 Objective    Physical Exam   Constitutional: He is oriented to person, place, and time. He appears well-developed and well-nourished. No distress.   HENT:   Head: Normocephalic and atraumatic.   Nose: Nose normal.   Mouth/Throat: Oropharynx is clear and moist.   Eyes: Conjunctivae and EOM are normal. Pupils are equal, round, and reactive to light. Right eye exhibits no discharge. Left eye exhibits no discharge.   Neck: Normal range of motion. Neck supple. No tracheal deviation present. No thyromegaly present.   Cardiovascular: Normal rate, regular rhythm, S1 normal, S2 normal, normal heart sounds and normal pulses.  Exam reveals no S3.    Pulmonary/Chest: Effort normal and breath sounds normal. No stridor. No respiratory distress. He exhibits no tenderness.   Abdominal: Soft. Bowel sounds are normal. He exhibits no distension and no mass. There is no tenderness. There is no rebound and no guarding.   Musculoskeletal: Normal range of motion. He exhibits no tenderness or deformity.   Lymphadenopathy:     He has no cervical adenopathy.   Neurological: He is alert and oriented to " person, place, and time. He has normal reflexes.   Skin: Skin is warm and dry. No rash noted. He is not diaphoretic. No erythema.   Psychiatric: He has a normal mood and affect. Thought content normal.         Lab Review:              Performed           Assessment:      Assessment           Diagnosis Plan   1. Chronic atrial fibrillation  ECG 12 Lead   2. Pacemaker  ECG 12 Lead   3. Anticoagulation goal of INR 2 to 3      4. Coronary artery disease involving native heart without angina pectoris, unspecified vessel or lesion type  ECG 12 Lead   5. Other hyperlipidemia               Plan:      Plan      1. Atrial Fibrillation and Atrial Flutter  Assessment  • The patient has paroxysmal atrial fibrillation  • This is non-valvular in etiology  • The patient's CHADS2-VASc score is 4  • A [id number] score of 2 or more is considered a high risk for a thromboembolic event  • Warfarin prescribed     Plan  • Continue in atrial fibrillation with rate control  • Continue warfarin for antithrombotic therapy, bleeding issues discussed  • Continue beta blocker for rate control     2. Coronary Artery Disease  Assessment  • The patient has no angina     Plan  • Lifestyle modifications discussed include adhering to a heart healthy diet, maintenance of a healthy weight, medication compliance and regular monitoring of cholesterol and blood pressure     Subjective - Objective  • There has been a previous stent procedure using GENA  • Current antiplatelet therapy includes aspirin 81 mg   3.  Sick sinus syndrome-status post pacemaker placement.  History of complete heart block.  We will enroll in our device clinic  4.  Chronic anticoagulation followed by yourself  5.  We will arrange for a one year appointment as well.     Thank you very much for allowing us to participate in the care of this pleasant patient.  Please don't hesitate to call if I can be of assistance in any way.        Current Outpatient Prescriptions:   •  acetaminophen  (TYLENOL) 500 MG tablet, Take 500 mg by mouth Every 6 (Six) Hours As Needed for Mild Pain ., Disp: , Rfl:   •  aspirin 81 MG tablet, Take 81 mg by mouth Daily., Disp: , Rfl:   •  docusate sodium (COLACE) 50 MG capsule, Take  by mouth 2 (Two) Times a Day., Disp: , Rfl:   •  enalapril (VASOTEC) 5 MG tablet, Take 5 mg by mouth Daily., Disp: , Rfl:   •  METOPROLOL TARTRATE PO, Take 75 mg by mouth. 1.5 in the am and 1.5 in the pm, Disp: , Rfl:   •  Multiple Vitamins-Minerals (ICAPS) capsule, Take  by mouth., Disp: , Rfl:   •  PARoxetine (PAXIL) 10 MG tablet, Take 10 mg by mouth Every Morning., Disp: , Rfl:   •  warfarin (COUMADIN) 5 MG tablet, Take 5 mg by mouth Daily., Disp: , Rfl:                   ·   Office Visit on 1/9/2018   ·    ·   Detailed Report   ·    H&P updated. The patient was examined and gen day

## 2018-11-05 NOTE — DISCHARGE INSTRUCTIONS
*** Restart your coumadin tomorrow ***      Outpatient Surgery Guidelines, Adult  Outpatient procedures are those for which the person having the procedure is allowed to go home the same day as the procedure. Various procedures are done on an outpatient basis. You should follow some general guidelines if you will be having an outpatient procedure.  AFTER THE  PROCEDURE  After surgery, you will be taken to a recovery area, where your progress will be monitored. If there are no complications, you will be allowed to go home when you are awake, stable, and taking fluids well. You may have numbness around the surgical site. Healing will take some time. You will have tenderness at the surgical site and may have some swelling and bruising. You may also have some nausea.  HOME CARE INSTRUCTIONS  · Do not drive for 24 hours, or as directed by your health care provider. Do not drive while taking prescription pain medicines.  · Do not drink alcohol for 24 hours.  · Do not make important decisions or sign legal documents for 24 hours.  · Plan on having a responsible adult stay with your for 24 hours following your procedure.  · You may resume a normal diet and activities as directed.  · Do not lift anything heavier than 10 pounds (4.5 kg) or play contact sports until your health care provider says it is okay.  · Only take over-the-counter or prescription medicines as directed by your health care provider.  · Follow up with your health care provider as directed.  SEEK MEDICAL CARE IF:  · You have increased bleeding (more than a small spot) from the surgical site.  · You have redness, swelling, or increasing pain in the wound.  · You see pus coming from the wound.  · You have a fever > 101.  · You notice a bad smell coming from the wound or dressing.  · You feel lightheaded or faint.  · You develop a rash.  · You have trouble breathing.  · You develop allergies.  MAKE SURE YOU:  · Understand these instructions.  · Will  watch your condition.  · Will get help right away if you are not doing well or get worse.

## 2018-11-07 ENCOUNTER — CLINICAL SUPPORT NO REQUIREMENTS (OUTPATIENT)
Dept: CARDIOLOGY | Facility: CLINIC | Age: 83
End: 2018-11-07

## 2018-11-07 ENCOUNTER — TELEPHONE (OUTPATIENT)
Dept: CARDIOLOGY | Facility: CLINIC | Age: 83
End: 2018-11-07

## 2018-11-07 DIAGNOSIS — I44.2 COMPLETE HEART BLOCK (HCC): Primary | ICD-10-CM

## 2018-11-07 NOTE — TELEPHONE ENCOUNTER
Patient's dual chamber pacemaker is still programmed DDDR. Patient has documented chronic afib and has been in afib 100% since we started following his device 1/9/18. Ok to reprogram VVIR?

## 2018-11-12 ENCOUNTER — CLINICAL SUPPORT NO REQUIREMENTS (OUTPATIENT)
Dept: CARDIOLOGY | Facility: CLINIC | Age: 83
End: 2018-11-12

## 2018-11-12 DIAGNOSIS — I44.2 COMPLETE HEART BLOCK (HCC): Primary | ICD-10-CM

## 2018-11-12 DIAGNOSIS — I48.20 CHRONIC ATRIAL FIBRILLATION (HCC): ICD-10-CM

## 2018-11-12 PROCEDURE — 93279 PRGRMG DEV EVAL PM/LDLS PM: CPT | Performed by: INTERNAL MEDICINE

## 2018-11-14 ENCOUNTER — CLINICAL SUPPORT (OUTPATIENT)
Dept: FAMILY MEDICINE CLINIC | Facility: CLINIC | Age: 83
End: 2018-11-14

## 2018-11-14 ENCOUNTER — TELEPHONE (OUTPATIENT)
Dept: FAMILY MEDICINE CLINIC | Facility: CLINIC | Age: 83
End: 2018-11-14

## 2018-11-14 DIAGNOSIS — I48.20 CHRONIC A-FIB (HCC): Primary | ICD-10-CM

## 2018-11-14 LAB — INR PPP: 1.7 (ref 0.9–1.1)

## 2018-11-14 PROCEDURE — 85610 PROTHROMBIN TIME: CPT | Performed by: FAMILY MEDICINE

## 2018-11-14 PROCEDURE — 93793 ANTICOAG MGMT PT WARFARIN: CPT | Performed by: FAMILY MEDICINE

## 2018-11-14 PROCEDURE — 36416 COLLJ CAPILLARY BLOOD SPEC: CPT | Performed by: FAMILY MEDICINE

## 2018-11-14 NOTE — TELEPHONE ENCOUNTER
Patient's wife called stating that the patient had to hold taking his warfarin while getting his pacemaker batteries replaced.

## 2018-11-15 NOTE — TELEPHONE ENCOUNTER
I'm not sure if a final decision was made today whether or not to try an alternative anticoagulant or have INR monitoring at home or if they will decide this weekend after discussing with their son. Just let me know so we can proceed with the correct orders. Thanks.

## 2018-11-20 ENCOUNTER — TELEPHONE (OUTPATIENT)
Dept: FAMILY MEDICINE CLINIC | Facility: CLINIC | Age: 83
End: 2018-11-20

## 2018-11-20 NOTE — TELEPHONE ENCOUNTER
I sent the eliquis (apixaban) to the pharmacy. He should stop warfarin and wait until his INR is less than 2 then start eliquis twice daily. Make sure check on price if they will be able to get and needs to come into the office for INR, let's have him stop taking Saturday and come in on Tuesday for INR.

## 2018-11-20 NOTE — TELEPHONE ENCOUNTER
Patient's wife called stating that she spoke with her son and he feels eliquis would be a good fit for the patient if the cost is affordable.

## 2018-11-20 NOTE — TELEPHONE ENCOUNTER
Spoke with patient's wife about his medication. He is to hold his warfarin until his INR is below 2 then start the eliquis. He will hold on Saturday and Sunday and recheck on Monday in office

## 2018-11-26 ENCOUNTER — CLINICAL SUPPORT (OUTPATIENT)
Dept: FAMILY MEDICINE CLINIC | Facility: CLINIC | Age: 83
End: 2018-11-26

## 2018-11-26 ENCOUNTER — ANTICOAGULATION VISIT (OUTPATIENT)
Dept: FAMILY MEDICINE CLINIC | Facility: CLINIC | Age: 83
End: 2018-11-26

## 2018-11-26 DIAGNOSIS — I25.10 CORONARY ARTERY DISEASE INVOLVING NATIVE HEART WITHOUT ANGINA PECTORIS, UNSPECIFIED VESSEL OR LESION TYPE: ICD-10-CM

## 2018-11-26 DIAGNOSIS — I48.20 CHRONIC ATRIAL FIBRILLATION (HCC): Chronic | ICD-10-CM

## 2018-11-26 DIAGNOSIS — I48.20 CHRONIC ATRIAL FIBRILLATION (HCC): Primary | ICD-10-CM

## 2018-11-26 LAB — INR PPP: 2 (ref 0.9–1.1)

## 2018-11-26 PROCEDURE — 85610 PROTHROMBIN TIME: CPT | Performed by: FAMILY MEDICINE

## 2018-11-26 PROCEDURE — 36416 COLLJ CAPILLARY BLOOD SPEC: CPT | Performed by: FAMILY MEDICINE

## 2018-12-26 RX ORDER — WARFARIN SODIUM 5 MG/1
TABLET ORAL
Qty: 90 TABLET | Refills: 1 | Status: SHIPPED | OUTPATIENT
Start: 2018-12-26 | End: 2019-01-11 | Stop reason: ALTCHOICE

## 2019-01-10 RX ORDER — METOPROLOL TARTRATE 50 MG/1
TABLET, FILM COATED ORAL
Qty: 270 TABLET | Refills: 1 | Status: SHIPPED | OUTPATIENT
Start: 2019-01-10 | End: 2019-07-09 | Stop reason: SDUPTHER

## 2019-01-11 ENCOUNTER — OFFICE VISIT (OUTPATIENT)
Dept: CARDIOLOGY | Facility: CLINIC | Age: 84
End: 2019-01-11

## 2019-01-11 VITALS
HEART RATE: 60 BPM | WEIGHT: 204 LBS | DIASTOLIC BLOOD PRESSURE: 82 MMHG | BODY MASS INDEX: 25.36 KG/M2 | HEIGHT: 75 IN | SYSTOLIC BLOOD PRESSURE: 110 MMHG

## 2019-01-11 DIAGNOSIS — Z79.01 ANTICOAGULATION GOAL OF INR 2 TO 3: Chronic | ICD-10-CM

## 2019-01-11 DIAGNOSIS — I25.10 CORONARY ARTERY DISEASE INVOLVING NATIVE HEART WITHOUT ANGINA PECTORIS, UNSPECIFIED VESSEL OR LESION TYPE: Primary | Chronic | ICD-10-CM

## 2019-01-11 DIAGNOSIS — Z51.81 ANTICOAGULATION GOAL OF INR 2 TO 3: Chronic | ICD-10-CM

## 2019-01-11 DIAGNOSIS — E78.49 OTHER HYPERLIPIDEMIA: Chronic | ICD-10-CM

## 2019-01-11 DIAGNOSIS — I48.20 CHRONIC ATRIAL FIBRILLATION (HCC): Chronic | ICD-10-CM

## 2019-01-11 DIAGNOSIS — Z95.0 PACEMAKER: Chronic | ICD-10-CM

## 2019-01-11 PROCEDURE — 99213 OFFICE O/P EST LOW 20 MIN: CPT | Performed by: INTERNAL MEDICINE

## 2019-01-11 PROCEDURE — 93000 ELECTROCARDIOGRAM COMPLETE: CPT | Performed by: INTERNAL MEDICINE

## 2019-01-11 NOTE — PROGRESS NOTES
Subjective:     Encounter Date:01/11/2019      Patient ID: Payam Campos is a 87 y.o. male.    Chief Complaint:  History of Present Illness    Dear Dr. Walker,    I had the pleasure seeing this patient in the office today for f/u.    He is a pleasant gentleman who previously had been followed by Dr. Khan at [facility].  He has a history of chronic atrial fibrillation, sick sinus syndrome, pacemaker in place, as well as CAD with prior STEMI (2006)and stents placed in his LAD.    The new generator is [employer] model number L3 1 1, serial #[id number]; this was placed 11/2018.    Since his last visit with me he's done well with no cardiac complaints.  There have been issues with generalized fatigue, and they state he falls asleep easily if he sitting down.  He is not a chest pain or chest discomfort.  He has not had any problems with shortness of breath at rest.  No lower extremity edema.  He denies any tachycardia palpitations.  No presyncope or syncope.    In 2006 he had an ST segment elevation myocardial infarction and stent placement.  In 2009 he had non-ST segment elevation MI in the setting of complete heart block.  In July 2013 he had a stress Cardiolite performed that showed small defect in the inferolateral wall there was felt to be of very low risk study.  Ejection fraction was 64%.     He has been showing increasing signs of dementia.    The following portions of the patient's history were reviewed and updated as appropriate: allergies, current medications, past family history, past medical history, past social history, past surgical history and problem list.    Past Medical History:   Diagnosis Date   • Abnormal glucose    • Allergic rhinitis    • Anxiety disorder    • Arteriosclerosis of coronary artery    • Ataxia    • Atrial fibrillation (CMS/HCC)    • Atrioventricular block, complete (CMS/HCC)    • Basal cell carcinoma     left temple 11/2002, shoulder 10/2007   • CAD (coronary  artery disease)    • Chest pain    • Chronic low back pain    • Conjunctivitis     left eye bacterial infection   • Constipation    • Coronary artery disease involving native heart without angina pectoris    • Depression    • Difficulty walking     leg weakness, coming on gradually for a year   • Earache, left    • Essential (primary) hypertension    • Excessive daytime sleepiness    • Falls    • Gait difficulty    • Heart disease    • High risk medication use    • Hyperlipidemia    • Hypertension    • Hypothyroidism    • Inguinal hernia    • Joint pain, knee    • Labyrinthitis    • Leg pain    • Leg weakness    • Long-term (current) use of anticoagulants    • Lumbar spondylosis    • Myocardial infarction (CMS/[facility])     STEMI, 2006 NSTEMI 2009   • NSTEMI (non-ST elevated myocardial infarction) (CMS/[facility]) 2009   • Obesity    • Overweight    • Pacemaker     dual-chamber, [employer]   • Rash    • Skin cancer     right shoulder resected 10/07 and left temple resected 11/02   • Sleep disturbance    • SOB (shortness of breath)    • STEMI (ST elevation myocardial infarction) (CMS/[facility]) 2005   • Weakness     progressive, making it difficult to walk       Past Surgical History:   Procedure Laterality Date   • CARDIAC ELECTROPHYSIOLOGY PROCEDURE N/A 11/5/2018    Procedure: PPM battery change   [place];  Surgeon: Gamaliel Velasquez MD;  Location: [facility] INVASIVE LOCATION;  Service: Cardiology   • CORONARY ANGIOPLASTY WITH STENT PLACEMENT     • EYE SURGERY Left     tear duct   • PACEMAKER IMPLANTATION         Social History     Socioeconomic History   • Marital status:      Spouse name: Not on file   • Number of children: Not on file   • Years of education: Not on file   • Highest education level: Not on file   Social Needs   • Financial resource strain: Not on file   • Food insecurity - worry: Not on file   • Food insecurity - inability: Not on file   • Transportation needs - medical: Not on file   •  Transportation needs - non-medical: Not on file   Occupational History   • Not on file   Tobacco Use   • Smoking status: Former Smoker   • Smokeless tobacco: Never Used   Substance and Sexual Activity   • Alcohol use: Yes     Comment: 4 oz of bourbon nightly   • Drug use: No   • Sexual activity: Defer   Other Topics Concern   • Not on file   Social History Narrative   • Not on file       Review of Systems   Constitution: Negative for chills, decreased appetite, fever and night sweats.   HENT: Negative for ear discharge, ear pain, hearing loss, nosebleeds and sore throat.    Eyes: Negative for blurred vision, double vision and pain.   Cardiovascular: Negative for cyanosis.   Respiratory: Negative for hemoptysis and sputum production.    Endocrine: Negative for cold intolerance and heat intolerance.   Hematologic/Lymphatic: Negative for adenopathy.   Skin: Negative for dry skin, itching, nail changes, rash and suspicious lesions.   Musculoskeletal: Negative for arthritis, gout, muscle cramps, muscle weakness, myalgias and neck pain.   Gastrointestinal: Negative for anorexia, bowel incontinence, constipation, diarrhea, dysphagia, hematemesis and jaundice.   Genitourinary: Negative for bladder incontinence, dysuria, flank pain, frequency, hematuria and nocturia.   Neurological: Negative for focal weakness, numbness, paresthesias and seizures.   Psychiatric/Behavioral: Negative for altered mental status, hallucinations, hypervigilance, suicidal ideas and thoughts of violence.   Allergic/Immunologic: Negative for persistent infections.         ECG 12 Lead  Date/Time: 2019 2:03 PM  Performed by: Rajan Borden III, MD  Authorized by: Rajan Borden III, MD   Comparison: compared with previous ECG   Similar to previous ECG  Rhythm: paced  Rate: normal  Conduction: left bundle branch block  QRS axis: left  Other: no other findings  Pacin% capture  Clinical impression: abnormal ECG               Objective:  "    Vitals:    01/11/19 1328   BP: 110/82   Pulse: 60   Weight: 92.5 kg (204 lb)   Height: 190.5 cm (75\")         Physical Exam   Constitutional: He is oriented to person, place, and time. He appears well-developed and well-nourished. No distress.   HENT:   Head: Normocephalic and atraumatic.   Nose: Nose normal.   Mouth/Throat: Oropharynx is clear and moist.   Eyes: Conjunctivae and EOM are normal. Pupils are equal, round, and reactive to light. Right eye exhibits no discharge. Left eye exhibits no discharge.   Neck: Normal range of motion. Neck supple. No tracheal deviation present. No thyromegaly present.   Cardiovascular: Normal rate, regular rhythm, S1 normal, S2 normal, normal heart sounds and normal pulses. Exam reveals no S3.   Pulmonary/Chest: Effort normal and breath sounds normal. No stridor. No respiratory distress. He exhibits no tenderness.   Abdominal: Soft. Bowel sounds are normal. He exhibits no distension and no mass. There is no tenderness. There is no rebound and no guarding.   Musculoskeletal: Normal range of motion. He exhibits no tenderness or deformity.   Lymphadenopathy:     He has no cervical adenopathy.   Neurological: He is alert and oriented to person, place, and time. He has normal reflexes.   Skin: Skin is warm and dry. No rash noted. He is not diaphoretic. No erythema.   Psychiatric: He has a normal mood and affect. Thought content normal.       Lab Review:             Performed        Assessment:          Diagnosis Plan   1. Coronary artery disease involving native heart without angina pectoris, unspecified vessel or lesion type  ECG 12 Lead   2. Chronic atrial fibrillation (CMS/HCC)  ECG 12 Lead   3. Anticoagulation goal of INR 2 to 3  ECG 12 Lead   4. Pacemaker  ECG 12 Lead   5. Other hyperlipidemia  ECG 12 Lead          Plan:       1. Atrial Fibrillation and Atrial Flutter  Assessment  • The patient has paroxysmal atrial fibrillation  • This is non-valvular in etiology  • The " patient's CHADS2-VASc score is 4  • A [id number] score of 2 or more is considered a high risk for a thromboembolic event  • Warfarin prescribed    Plan  • Continue in atrial fibrillation with rate control  • Continue warfarin for antithrombotic therapy, bleeding issues discussed  • Continue beta blocker for rate control    2. Coronary Artery Disease  Assessment  • The patient has no angina    Plan  • Lifestyle modifications discussed include adhering to a heart healthy diet, maintenance of a healthy weight, medication compliance and regular monitoring of cholesterol and blood pressure    Subjective - Objective  • There has been a previous stent procedure using GENA  • Current antiplatelet therapy includes aspirin 81 mg    3.  Sick sinus syndrome-status post pacemaker placement.  History of complete heart block.  Followed in our device clinic  4.  Chronic anticoagulation followed by yourself  5.  We will arrange for a one year appointment as well.    Thank you very much for allowing us to participate in the care of this pleasant patient.  Please don't hesitate to call if I can be of assistance in any way.      Current Outpatient Medications:   •  acetaminophen (TYLENOL) 500 MG tablet, Take 500 mg by mouth 2 (Two) Times a Day., Disp: , Rfl:   •  apixaban (ELIQUIS) 5 MG tablet tablet, Take 1 tablet by mouth Every 12 (Twelve) Hours. Start when INR is less than 2., Disp: 60 tablet, Rfl: 1  •  aspirin 81 MG tablet, Take 81 mg by mouth Daily., Disp: , Rfl:   •  docusate sodium (COLACE) 50 MG capsule, Take 100 mg by mouth 2 (Two) Times a Day., Disp: , Rfl:   •  enalapril (VASOTEC) 5 MG tablet, Take 1 tablet by mouth Daily., Disp: 7 tablet, Rfl: 0  •  metoprolol tartrate (LOPRESSOR) 50 MG tablet, TAKE ONE AND ONE-HALF TABLETS EVERY 12 HOURS IN THE MORNING AND IN THE EVENING, Disp: 270 tablet, Rfl: 1  •  Multiple Vitamins-Minerals (ICAPS) capsule, Take 1 tablet by mouth Daily., Disp: , Rfl:   •  polyethylene glycol (MIRALAX)  packet, Take 17 g by mouth Daily. (Patient taking differently: Take 17 g by mouth As Needed.), Disp: 100 each, Rfl: 0  •  vitamin B-12 (CYANOCOBALAMIN) 1000 MCG tablet, Take 1,000 mcg by mouth Daily., Disp: , Rfl:

## 2019-01-17 ENCOUNTER — TELEPHONE (OUTPATIENT)
Dept: FAMILY MEDICINE CLINIC | Facility: CLINIC | Age: 84
End: 2019-01-17

## 2019-01-17 NOTE — TELEPHONE ENCOUNTER
[facility] pharmacist called office today inquiring about a form that we faxed back to them that they had originated. They wanted to know if this patient was taking coumadin and eliquis together at the same time. I informed him that he wasn't r/t coumadin d/c'd. Contacted patient and verified by speaking with wife and she confirmed only taking Eliquis. Patient wife stated that they would need rx refill soon. Told patient I would [facility].

## 2019-01-22 RX ORDER — ENALAPRIL MALEATE 5 MG/1
5 TABLET ORAL DAILY
Qty: 90 TABLET | Refills: 1 | Status: SHIPPED | OUTPATIENT
Start: 2019-01-22 | End: 2019-04-10 | Stop reason: SDUPTHER

## 2019-02-15 ENCOUNTER — CLINICAL SUPPORT NO REQUIREMENTS (OUTPATIENT)
Dept: CARDIOLOGY | Facility: CLINIC | Age: 84
End: 2019-02-15

## 2019-02-15 DIAGNOSIS — I48.20 CHRONIC ATRIAL FIBRILLATION (HCC): ICD-10-CM

## 2019-02-15 DIAGNOSIS — I44.2 COMPLETE HEART BLOCK (HCC): Primary | ICD-10-CM

## 2019-02-15 PROCEDURE — 93294 REM INTERROG EVL PM/LDLS PM: CPT | Performed by: INTERNAL MEDICINE

## 2019-02-15 PROCEDURE — 93296 REM INTERROG EVL PM/IDS: CPT | Performed by: INTERNAL MEDICINE

## 2019-04-10 ENCOUNTER — OFFICE VISIT (OUTPATIENT)
Dept: FAMILY MEDICINE CLINIC | Facility: CLINIC | Age: 84
End: 2019-04-10

## 2019-04-10 DIAGNOSIS — R53.83 FATIGUE, UNSPECIFIED TYPE: ICD-10-CM

## 2019-04-10 DIAGNOSIS — R53.81 PHYSICAL DEBILITY: ICD-10-CM

## 2019-04-10 DIAGNOSIS — K59.04 CHRONIC IDIOPATHIC CONSTIPATION: ICD-10-CM

## 2019-04-10 DIAGNOSIS — R53.1 WEAKNESS: ICD-10-CM

## 2019-04-10 DIAGNOSIS — I10 ESSENTIAL HYPERTENSION: Primary | ICD-10-CM

## 2019-04-10 PROBLEM — Z79.01 ANTICOAGULATION GOAL OF INR 2 TO 3: Chronic | Status: RESOLVED | Noted: 2017-11-29 | Resolved: 2019-04-10

## 2019-04-10 PROBLEM — Z51.81 ANTICOAGULATION GOAL OF INR 2 TO 3: Chronic | Status: RESOLVED | Noted: 2017-11-29 | Resolved: 2019-04-10

## 2019-04-10 PROBLEM — H35.30 MACULAR DEGENERATION: Status: ACTIVE | Noted: 2019-04-10

## 2019-04-10 PROCEDURE — 99214 OFFICE O/P EST MOD 30 MIN: CPT | Performed by: FAMILY MEDICINE

## 2019-04-10 RX ORDER — ENALAPRIL MALEATE 5 MG/1
5 TABLET ORAL DAILY
Qty: 90 TABLET | Refills: 1 | Status: SHIPPED | OUTPATIENT
Start: 2019-04-10 | End: 2019-09-29 | Stop reason: SDUPTHER

## 2019-04-10 NOTE — PROGRESS NOTES
Subjective   Payam Campos is a 87 y.o. male.   Chief Complaint   Patient presents with   • Hypertension     6 mth f/u    • Weight Loss      down 11 lbs since jan   • Fatigue     energy level less         History of Present Illness   Fatigue   He is not feeling as strong as last time. He is doing PT, gets on the arm cycle. He goes for 15 min. That's just a small part of his therapy. He does OT 2-3 times per week. He is walking and doing a lot of leg exercises. Just not as strong. He can fall asleep easily during the day.    Wife reports his memory is getting worse. He is not forgetting more than just short term, he is forgetting grandchildren. She is concerned with him drinking alcohol. She allows him to have one bourbon in the evening. Always disagrees and wants another. She has concern for medication interaction and falls. He walks with the walker at all times but he has his bourbon, then to bed and will get up overnight and go the the living room to lie in his recliner. He has not fallen but she is concerned this behavior makes him more likely.     Weight loss  Noted on vitals today. Wife also says she feels like she is going to have to get suspenders for his pants are too big. He eats twice daily, not a great appetite, pt reports that nothing tastes that good. He eats late breakfast and dinner. No snacks or extras. Sometimes has milkshakes.    Needs a 90 day supply next time the eliquis comes up for refill related to insurance and cost.     The following portions of the patient's history were reviewed and updated as appropriate: allergies, current medications, past medical history, past social history and problem list.    Review of Systems   Constitutional: Positive for fatigue and unexpected weight change.   Respiratory: Negative.    Cardiovascular: Negative.    Neurological: Negative.        Vitals:    04/10/19 1206   BP: 110/64   BP Location: Left arm   Patient Position: Sitting   Cuff Size: Adult  "  Pulse: 64   Resp: 17   Temp: 97.5 °F (36.4 °C)   TempSrc: Oral   SpO2: 98%   Weight: 84.1 kg (185 lb 8 oz)   Height: 190.5 cm (75\")       Objective   Physical Exam   Constitutional: He is oriented to person, place, and time. He appears well-nourished. No distress.   Eyes: Conjunctivae are normal. Right eye exhibits no discharge. Left eye exhibits no discharge. No scleral icterus.   Cardiovascular: Normal rate, regular rhythm, normal heart sounds and intact distal pulses. Exam reveals no gallop and no friction rub.   No murmur heard.  Pulmonary/Chest: Effort normal and breath sounds normal. No respiratory distress. He has no wheezes.   Musculoskeletal: He exhibits no edema.   Neurological: He is alert and oriented to person, place, and time.   Psychiatric: He has a normal mood and affect. His behavior is normal.   Vitals reviewed.      Assessment/Plan   Payam was seen today for hypertension, weight loss and fatigue.    Diagnoses and all orders for this visit:    Essential hypertension  -     Comprehensive Metabolic Panel  -     CBC & Differential  BP today on lower end, considering fatigue, BP control, risk for falls will reduce the metoprolol from 75 mg BID to 50 mg BID. Has BP taken at PT routinely.   Weakness  Physical debility  Chronic problem, continues to do well with PT and ambulating with his walker short distances.   Chronic idiopathic constipation  Use the miralax more regularly, smaller dose to avoid diarrhea.  Fatigue, unspecified type  -     Comprehensive Metabolic Panel  -     CBC & Differential  Check labs today, encouraged continued activity. Active during day, sleep during night. Reduce metoprolol dosing. Also encouraged nutrition, eat 2-3 meals per day. Take Ensure shakes between meals, 2 per day. Close follow up on weight and nutrition, 3 months.  Other orders  -     enalapril (VASOTEC) 5 MG tablet; Take 1 tablet by mouth Daily.               "

## 2019-04-10 NOTE — PATIENT INSTRUCTIONS
Metoprolol 50 mg take one tablet twice daily. Change from 1.5 tablets twice daily.     Start drinking Ensure twice daily between meals.

## 2019-04-11 VITALS
SYSTOLIC BLOOD PRESSURE: 110 MMHG | HEART RATE: 64 BPM | HEIGHT: 75 IN | OXYGEN SATURATION: 98 % | WEIGHT: 193 LBS | DIASTOLIC BLOOD PRESSURE: 64 MMHG | TEMPERATURE: 97.5 F | BODY MASS INDEX: 24 KG/M2 | RESPIRATION RATE: 17 BRPM

## 2019-04-11 LAB
ALBUMIN SERPL-MCNC: 4.2 G/DL (ref 3.5–5.2)
ALBUMIN/GLOB SERPL: 2 G/DL
ALP SERPL-CCNC: 91 U/L (ref 39–117)
ALT SERPL-CCNC: 22 U/L (ref 1–41)
AST SERPL-CCNC: 24 U/L (ref 1–40)
BASOPHILS # BLD AUTO: 0.04 10*3/MM3 (ref 0–0.2)
BASOPHILS NFR BLD AUTO: 0.9 % (ref 0–1.5)
BILIRUB SERPL-MCNC: 1.5 MG/DL (ref 0.2–1.2)
BUN SERPL-MCNC: 18 MG/DL (ref 8–23)
BUN/CREAT SERPL: 17.5 (ref 7–25)
CALCIUM SERPL-MCNC: 9.6 MG/DL (ref 8.6–10.5)
CHLORIDE SERPL-SCNC: 105 MMOL/L (ref 98–107)
CO2 SERPL-SCNC: 28.3 MMOL/L (ref 22–29)
CREAT SERPL-MCNC: 1.03 MG/DL (ref 0.76–1.27)
EOSINOPHIL # BLD AUTO: 0.19 10*3/MM3 (ref 0–0.4)
EOSINOPHIL NFR BLD AUTO: 4.1 % (ref 0.3–6.2)
ERYTHROCYTE [DISTWIDTH] IN BLOOD BY AUTOMATED COUNT: 13.8 % (ref 12.3–15.4)
GLOBULIN SER CALC-MCNC: 2.1 GM/DL
GLUCOSE SERPL-MCNC: 80 MG/DL (ref 65–99)
HCT VFR BLD AUTO: 47.7 % (ref 37.5–51)
HGB BLD-MCNC: 15.5 G/DL (ref 13–17.7)
IMM GRANULOCYTES # BLD AUTO: 0.01 10*3/MM3 (ref 0–0.05)
IMM GRANULOCYTES NFR BLD AUTO: 0.2 % (ref 0–0.5)
LYMPHOCYTES # BLD AUTO: 0.63 10*3/MM3 (ref 0.7–3.1)
LYMPHOCYTES NFR BLD AUTO: 13.8 % (ref 19.6–45.3)
MCH RBC QN AUTO: 32.5 PG (ref 26.6–33)
MCHC RBC AUTO-ENTMCNC: 32.5 G/DL (ref 31.5–35.7)
MCV RBC AUTO: 100 FL (ref 79–97)
MONOCYTES # BLD AUTO: 0.6 10*3/MM3 (ref 0.1–0.9)
MONOCYTES NFR BLD AUTO: 13.1 % (ref 5–12)
NEUTROPHILS # BLD AUTO: 3.11 10*3/MM3 (ref 1.4–7)
NEUTROPHILS NFR BLD AUTO: 67.9 % (ref 42.7–76)
NRBC BLD AUTO-RTO: 0 /100 WBC (ref 0–0)
PLATELET # BLD AUTO: 149 10*3/MM3 (ref 140–450)
POTASSIUM SERPL-SCNC: 4.2 MMOL/L (ref 3.5–5.2)
PROT SERPL-MCNC: 6.3 G/DL (ref 6–8.5)
RBC # BLD AUTO: 4.77 10*6/MM3 (ref 4.14–5.8)
SODIUM SERPL-SCNC: 141 MMOL/L (ref 136–145)
WBC # BLD AUTO: 4.58 10*3/MM3 (ref 3.4–10.8)

## 2019-05-23 ENCOUNTER — TELEPHONE (OUTPATIENT)
Dept: CARDIOLOGY | Facility: CLINIC | Age: 84
End: 2019-05-23

## 2019-05-23 ENCOUNTER — CLINICAL SUPPORT NO REQUIREMENTS (OUTPATIENT)
Dept: CARDIOLOGY | Facility: CLINIC | Age: 84
End: 2019-05-23

## 2019-05-23 DIAGNOSIS — I48.20 CHRONIC ATRIAL FIBRILLATION (HCC): ICD-10-CM

## 2019-05-23 DIAGNOSIS — I44.2 THIRD DEGREE AV BLOCK (HCC): Primary | ICD-10-CM

## 2019-05-23 PROCEDURE — 93296 REM INTERROG EVL PM/IDS: CPT | Performed by: INTERNAL MEDICINE

## 2019-05-23 PROCEDURE — 93294 REM INTERROG EVL PM/LDLS PM: CPT | Performed by: INTERNAL MEDICINE

## 2019-07-09 RX ORDER — METOPROLOL TARTRATE 50 MG/1
TABLET, FILM COATED ORAL
Qty: 270 TABLET | Refills: 1 | Status: SHIPPED | OUTPATIENT
Start: 2019-07-09 | End: 2020-01-13

## 2019-07-17 ENCOUNTER — OFFICE VISIT (OUTPATIENT)
Dept: FAMILY MEDICINE CLINIC | Facility: CLINIC | Age: 84
End: 2019-07-17

## 2019-07-17 VITALS
HEIGHT: 75 IN | SYSTOLIC BLOOD PRESSURE: 128 MMHG | BODY MASS INDEX: 24.33 KG/M2 | DIASTOLIC BLOOD PRESSURE: 62 MMHG | RESPIRATION RATE: 15 BRPM | OXYGEN SATURATION: 98 % | WEIGHT: 195.7 LBS | HEART RATE: 60 BPM | TEMPERATURE: 98 F

## 2019-07-17 DIAGNOSIS — I10 ESSENTIAL HYPERTENSION: ICD-10-CM

## 2019-07-17 DIAGNOSIS — M54.31 SCIATICA OF RIGHT SIDE: Primary | ICD-10-CM

## 2019-07-17 PROCEDURE — 99214 OFFICE O/P EST MOD 30 MIN: CPT | Performed by: FAMILY MEDICINE

## 2019-07-17 NOTE — PROGRESS NOTES
"Chief Complaint   Patient presents with   • Weight Check   • Hypertension     3 mth follow        Subjective   Payam Campos is a 87 y.o. male.     History of Present Illness   Weight check  Stable, up 2 lbs. He takes two boost per day. Eating regularly. Says he still feels like he is going down hill, just feeling tired, not as strong especially in his legs.     HTN  Well controlled. He is on beta blocer and ACE inhibitor.    He uses his wheelchair  Shooting pain in the right leg. Intermittent pain, not hurting now. But if leg is a certain way it is a shooting pain down the side of the right thigh, can go down to the foot there. He moves around and rubs the leg and helps with the pain. He is doing PT.    The following portions of the patient's history were reviewed and updated as appropriate: allergies, current medications, past medical history, past social history and problem list.    Review of Systems   Respiratory: Negative.    Cardiovascular: Negative.    Neurological: Negative.        Vitals:    07/17/19 1134   BP: 128/62   BP Location: Left arm   Patient Position: Sitting   Cuff Size: Adult   Pulse: 60   Resp: 15   Temp: 98 °F (36.7 °C)   TempSrc: Oral   SpO2: 98%   Weight: 88.8 kg (195 lb 11.2 oz)   Height: 190.5 cm (75\")       Objective   Physical Exam   Constitutional: He is oriented to person, place, and time. He appears well-nourished. No distress.   Eyes: Conjunctivae are normal. Right eye exhibits no discharge. Left eye exhibits no discharge. No scleral icterus.   Cardiovascular: Normal rate, regular rhythm, normal heart sounds and intact distal pulses. Exam reveals no gallop and no friction rub.   No murmur heard.  Pulmonary/Chest: Effort normal and breath sounds normal. No respiratory distress. He has no wheezes.   Musculoskeletal: He exhibits no edema.   Neurological: He is alert and oriented to person, place, and time.   Psychiatric: He has a normal mood and affect. His behavior is normal. "   Vitals reviewed.  today pt is more alert, not falling asleep. He is interactive and asked me questions about my training and family. He offered to stand up and stood for about 5 minutes without assistance standing or sitting back down.    Assessment/Plan   Payam was seen today for weight check and hypertension.    Diagnoses and all orders for this visit:    Sciatica of right side    Essential hypertension  -     Basic Metabolic Panel    Other orders  -     Cancel: apixaban (ELIQUIS) 5 MG tablet tablet; Take 1 tablet by mouth Every 12 (Twelve) Hours.    weight is stable to improved, continue the boost for extra nutrition and calories.     BP well controlled.    Reports from pt and wife that he still naps a lot and is tired during the day. Wondering if any of the other medications can make him tired. Potentially the beta blocker but he needs to be on this for his heart. We could consider cutting dose in half to 25 BID as his BP would likely tolerate. I will discuss with Dr. Borden cardiology per pt and his wife's request.     For the new shooting right leg pain sounding a lot like sciatica will send [facility] 3 times per week for strength training so they can address with stretching etc. He is not having any new or worse low back pain. Just pain in the right lateral thigh that sometimes goes down further toward his foot. Happens with certain movements relieved by rubbing and changing position.

## 2019-07-18 LAB
BUN SERPL-MCNC: 16 MG/DL (ref 8–23)
BUN/CREAT SERPL: 13.9 (ref 7–25)
CALCIUM SERPL-MCNC: 9.2 MG/DL (ref 8.6–10.5)
CHLORIDE SERPL-SCNC: 103 MMOL/L (ref 98–107)
CO2 SERPL-SCNC: 27.8 MMOL/L (ref 22–29)
CREAT SERPL-MCNC: 1.15 MG/DL (ref 0.76–1.27)
GLUCOSE SERPL-MCNC: 86 MG/DL (ref 65–99)
POTASSIUM SERPL-SCNC: 4.4 MMOL/L (ref 3.5–5.2)
SODIUM SERPL-SCNC: 141 MMOL/L (ref 136–145)

## 2019-07-22 RX ORDER — POLYETHYLENE GLYCOL 3350 17 G/17G
17 POWDER, FOR SOLUTION ORAL AS NEEDED
Qty: 100 PACKET | Refills: 1 | Status: SHIPPED | OUTPATIENT
Start: 2019-07-22 | End: 2019-08-09

## 2019-08-09 ENCOUNTER — TELEPHONE (OUTPATIENT)
Dept: FAMILY MEDICINE CLINIC | Facility: CLINIC | Age: 84
End: 2019-08-09

## 2019-08-09 DIAGNOSIS — M54.31 RIGHT SIDED SCIATICA: Primary | ICD-10-CM

## 2019-08-09 RX ORDER — POLYETHYLENE GLYCOL 3350 17 G/17G
17 POWDER, FOR SOLUTION ORAL DAILY
Qty: 850 G | Refills: 1 | Status: SHIPPED | OUTPATIENT
Start: 2019-08-09 | End: 2020-01-13 | Stop reason: ALTCHOICE

## 2019-08-09 NOTE — TELEPHONE ENCOUNTER
Spoke to the wife and she states that patient is taking 50 mg metoprolol BID. Per Aaron's note advised her to cut dose in half to 25 mg BID. Wife voiced understanding. Pt wife asked for a order for [facility] for [facility] patient r/t his sciatica and for them to teach him exercises to help him.

## 2019-08-09 NOTE — TELEPHONE ENCOUNTER
----- Message from Kathy Walker MD sent at 8/8/2019  1:01 PM EDT -----  Regarding: medication change  I called and left message that I have heard back from Dr. Rajan Borden with cardiology. He said it would be ok to lower the AM and PM dose of metoprolol and see if that helps his drowsiness during the day. Please clarify if he is taking 75 mg BID or 50 mg BID. Lower the dose by 25 mg in the AM and 25 mg in the PM and let me know what that dosing will be please. Thanks.

## 2019-08-12 NOTE — TELEPHONE ENCOUNTER
I ordered the PT for sciatica. Please fax it to his PT. Should have number from scanned in reports from PT. Thanks.

## 2019-08-23 ENCOUNTER — CLINICAL SUPPORT NO REQUIREMENTS (OUTPATIENT)
Dept: CARDIOLOGY | Facility: CLINIC | Age: 84
End: 2019-08-23

## 2019-08-23 DIAGNOSIS — I44.2 COMPLETE HEART BLOCK (HCC): Primary | ICD-10-CM

## 2019-08-23 PROCEDURE — 93296 REM INTERROG EVL PM/IDS: CPT | Performed by: INTERNAL MEDICINE

## 2019-08-23 PROCEDURE — 93294 REM INTERROG EVL PM/LDLS PM: CPT | Performed by: INTERNAL MEDICINE

## 2019-09-30 RX ORDER — ENALAPRIL MALEATE 5 MG/1
TABLET ORAL
Qty: 90 TABLET | Refills: 1 | Status: SHIPPED | OUTPATIENT
Start: 2019-09-30 | End: 2020-03-30

## 2019-12-11 ENCOUNTER — CLINICAL SUPPORT NO REQUIREMENTS (OUTPATIENT)
Dept: CARDIOLOGY | Facility: CLINIC | Age: 84
End: 2019-12-11

## 2019-12-11 DIAGNOSIS — I44.2 COMPLETE HEART BLOCK (HCC): Primary | ICD-10-CM

## 2019-12-11 PROCEDURE — 93279 PRGRMG DEV EVAL PM/LDLS PM: CPT | Performed by: INTERNAL MEDICINE

## 2020-01-13 ENCOUNTER — OFFICE VISIT (OUTPATIENT)
Dept: FAMILY MEDICINE CLINIC | Facility: CLINIC | Age: 85
End: 2020-01-13

## 2020-01-13 ENCOUNTER — OFFICE VISIT (OUTPATIENT)
Dept: CARDIOLOGY | Facility: CLINIC | Age: 85
End: 2020-01-13

## 2020-01-13 VITALS
OXYGEN SATURATION: 99 % | HEART RATE: 59 BPM | RESPIRATION RATE: 13 BRPM | SYSTOLIC BLOOD PRESSURE: 116 MMHG | BODY MASS INDEX: 24.92 KG/M2 | DIASTOLIC BLOOD PRESSURE: 58 MMHG | HEIGHT: 75 IN | WEIGHT: 200.4 LBS

## 2020-01-13 VITALS
DIASTOLIC BLOOD PRESSURE: 80 MMHG | BODY MASS INDEX: 24.87 KG/M2 | HEIGHT: 75 IN | HEART RATE: 60 BPM | WEIGHT: 200 LBS | SYSTOLIC BLOOD PRESSURE: 120 MMHG

## 2020-01-13 DIAGNOSIS — I10 ESSENTIAL HYPERTENSION: ICD-10-CM

## 2020-01-13 DIAGNOSIS — E78.49 OTHER HYPERLIPIDEMIA: Chronic | ICD-10-CM

## 2020-01-13 DIAGNOSIS — I48.20 CHRONIC ATRIAL FIBRILLATION (HCC): Chronic | ICD-10-CM

## 2020-01-13 DIAGNOSIS — I48.21 PERMANENT ATRIAL FIBRILLATION (HCC): ICD-10-CM

## 2020-01-13 DIAGNOSIS — Z95.0 PACEMAKER: Chronic | ICD-10-CM

## 2020-01-13 DIAGNOSIS — I25.10 CORONARY ARTERY DISEASE INVOLVING NATIVE HEART WITHOUT ANGINA PECTORIS, UNSPECIFIED VESSEL OR LESION TYPE: Primary | Chronic | ICD-10-CM

## 2020-01-13 DIAGNOSIS — K40.90 NON-RECURRENT UNILATERAL INGUINAL HERNIA WITHOUT OBSTRUCTION OR GANGRENE: ICD-10-CM

## 2020-01-13 DIAGNOSIS — Z00.00 MEDICARE ANNUAL WELLNESS VISIT, SUBSEQUENT: Primary | ICD-10-CM

## 2020-01-13 PROCEDURE — 99214 OFFICE O/P EST MOD 30 MIN: CPT | Performed by: INTERNAL MEDICINE

## 2020-01-13 PROCEDURE — G0439 PPPS, SUBSEQ VISIT: HCPCS | Performed by: FAMILY MEDICINE

## 2020-01-13 PROCEDURE — 93000 ELECTROCARDIOGRAM COMPLETE: CPT | Performed by: INTERNAL MEDICINE

## 2020-01-13 PROCEDURE — 99213 OFFICE O/P EST LOW 20 MIN: CPT | Performed by: FAMILY MEDICINE

## 2020-01-13 RX ORDER — METOPROLOL SUCCINATE 25 MG/1
25 TABLET, EXTENDED RELEASE ORAL DAILY
Qty: 90 TABLET | Refills: 3 | Status: SHIPPED | OUTPATIENT
Start: 2020-01-13 | End: 2021-01-05

## 2020-01-13 NOTE — PROGRESS NOTES
The ABCs of the Annual Wellness Visit  Subsequent Medicare Wellness Visit    Chief Complaint   Patient presents with   • Medicare Wellness-subsequent       Subjective   History of Present Illness:  Payam Campos is a 88 y.o. male who presents for a Subsequent Medicare Wellness Visit.    HEALTH RISK ASSESSMENT    Recent Hospitalizations:  No hospitalization(s) within the last year.    Current Medical Providers:  Patient Care Team:  Kathy Walker MD as PCP - General (Family Medicine)  Kathy Walker MD as PCP - Claims Attributed    Smoking Status:  Social History     Tobacco Use   Smoking Status Former Smoker   Smokeless Tobacco Never Used       Alcohol Consumption:  Social History     Substance and Sexual Activity   Alcohol Use Yes    Comment: 4 oz of bourbon nightly       Depression Screen:   PHQ-2/PHQ-9 Depression Screening 4/10/2019   Little interest or pleasure in doing things 1   Feeling down, depressed, or hopeless 0   Total Score 1       Fall Risk Screen:  ARLENADI Fall Risk Assessment has not been completed.    Health Habits and Functional and Cognitive Screening:  Functional & Cognitive Status 1/13/2020   Do you have difficulty preparing food and eating? Yes   Do you have difficulty bathing yourself, getting dressed or grooming yourself? Yes   Do you have difficulty using the toilet? Yes   Do you have difficulty moving around from place to place? Yes   Do you have trouble with steps or getting out of a bed or a chair? Yes   Current Diet Well Balanced Diet   Dental Exam Up to date   Eye Exam Not up to date   Exercise (times per week) 3 times per week   Current Exercise Activities Include He does PT 2 times weekly to work on strength and gait.   Do you need help using the phone?  No   Are you deaf or do you have serious difficulty hearing?  Yes   Do you need help with transportation? Yes   Do you need help shopping? Yes   Do you need help preparing meals?  Yes   Do you need help with housework?  Yes    Do you need help with laundry? Yes   Do you need help taking your medications? Yes   Do you need help managing money? Yes   Do you ever drive or ride in a car without wearing a seat belt? Yes   Have you felt unusual stress, anger or loneliness in the last month? No   Who do you live with? Spouse   If you need help, do you have trouble finding someone available to you? No   Have you been bothered in the last four weeks by sexual problems? No   Do you have difficulty concentrating, remembering or making decisions? Yes         Does the patient have evidence of cognitive impairment? Yes    Asprin use counseling:Taking ASA appropriately as indicated    Age-appropriate Screening Schedule:  Refer to the list below for future screening recommendations based on patient's age, sex and/or medical conditions. Orders for these recommended tests are listed in the plan section. The patient has been provided with a written plan.    Health Maintenance   Topic Date Due   • TDAP/TD VACCINES (1 - Tdap) 07/29/1942   • ZOSTER VACCINE (1 of 2) 07/29/1981   • LIPID PANEL  11/29/2017   • INFLUENZA VACCINE  Completed          The following portions of the patient's history were reviewed and updated as appropriate: allergies, current medications, past family history, past medical history, past social history, past surgical history and problem list.    Outpatient Medications Prior to Visit   Medication Sig Dispense Refill   • acetaminophen (TYLENOL) 500 MG tablet Take 500 mg by mouth 2 (Two) Times a Day.     • apixaban (ELIQUIS) 5 MG tablet tablet Take 1 tablet by mouth Every 12 (Twelve) Hours. 180 tablet 1   • aspirin 81 MG tablet Take 81 mg by mouth Daily.     • docusate sodium (COLACE) 50 MG capsule Take 250 mg by mouth Daily.     • enalapril (VASOTEC) 5 MG tablet TAKE 1 TABLET DAILY 90 tablet 1   • Multiple Vitamins-Minerals (ICAPS) capsule Take 1 tablet by mouth Daily.     • metoprolol tartrate (LOPRESSOR) 50 MG tablet TAKE ONE AND  "ONE-HALF TABLETS EVERY 12 HOURS IN THE MORNING AND IN THE EVENING (Patient taking differently: TAKE ONE AND TABLETS EVERY 12 HOURS IN THE MORNING AND IN THE EVENING) 270 tablet 1   • vitamin B-12 (CYANOCOBALAMIN) 1000 MCG tablet Take 1,000 mcg by mouth Daily.     • polyethylene glycol (GLYCOLAX) powder Take 17 g by mouth Daily. 850 g 1     No facility-administered medications prior to visit.        Patient Active Problem List   Diagnosis   • Physical debility   • Coronary artery disease without angina pectoris   • Pacemaker   • Other hyperlipidemia   • Chronic atrial fibrillation   • Anxiety   • Atrophy of muscle of multiple sites   • Weakness   • Risk for falls   • Mild single current episode of major depressive disorder (CMS/HCC)   • Depressed mood   • Heart block   • Macular degeneration   • Essential hypertension       Advanced Care Planning:  Patient has an advance directive - a copy has not been provided. Have asked the patient to send this to us to add to record    Review of Systems   Respiratory: Negative.    Cardiovascular: Negative.    Neurological: Negative.        Compared to one year ago, the patient feels his physical health is the same.  Compared to one year ago, the patient feels his mental health is the same.    Reviewed chart for potential of high risk medication in the elderly: yes  Reviewed chart for potential of harmful drug interactions in the elderly:yes    Objective         Vitals:    01/13/20 1203   BP: 116/58   BP Location: Left arm   Patient Position: Sitting   Cuff Size: Adult   Pulse: 59   Resp: 13   SpO2: 99%   Weight: 90.9 kg (200 lb 6.4 oz)   Height: 190.5 cm (75\")       Body mass index is 25.05 kg/m².  Discussed the patient's BMI with him. The BMI is in the acceptable range.    Physical Exam   Constitutional: He is oriented to person, place, and time. He appears well-nourished. No distress.   Eyes: Conjunctivae are normal. Right eye exhibits no discharge. Left eye exhibits no " discharge. No scleral icterus.   Cardiovascular: Normal rate, regular rhythm, normal heart sounds and intact distal pulses. Exam reveals no gallop and no friction rub.   No murmur heard.  Pulmonary/Chest: Effort normal and breath sounds normal. No respiratory distress. He has no wheezes.   Genitourinary: Penis normal.   Genitourinary Comments: Exam for inguinal hernia reveals very small defect on the left, inguinal canal without contents, there was no change with valsalva. Right side was normal.    Musculoskeletal: He exhibits no edema.   Pt stood for today for several minutes during the exam for inguinal hernia, had some assistance from MA.    Neurological: He is alert and oriented to person, place, and time.   Psychiatric: He has a normal mood and affect. His behavior is normal.   Vitals reviewed.      Lab Results   Component Value Date    GLU 97 01/13/2020        Assessment/Plan   Medicare Risks and Personalized Health Plan  CMS Preventative Services Quick Reference  Advance Directive Discussion  Dementia/Memory   Depression/Dysphoria  Pt's mood improved. He is currently doing well. He is engaging more than he had been previously with others around him and family over the holidays.   He does have trouble with memory. Most upsetting per wife is that he does not remember people or remember that certain people in their lives have passed away.     The above risks/problems have been discussed with the patient.  Pertinent information has been shared with the patient in the After Visit Summary.  Follow up plans and orders are seen below in the Assessment/Plan Section.    Diagnoses and all orders for this visit:    1. Medicare annual wellness visit, subsequent (Primary)    2. Essential hypertension  -     CBC & Differential  -     Comprehensive Metabolic Panel    3. Non-recurrent unilateral inguinal hernia without obstruction or gangrene    Other orders  -     psyllium (METAMUCIL) 0.52 g capsule; Take 2 capsules by mouth  2 (Two) Times a Day.  Dispense: 360 capsule; Refill: 1      Follow Up:  No follow-ups on file.     An After Visit Summary and PPPS were given to the patient.       Pt overall is doing well. Had battery replaced in his pacemaker. He is followed by cardiology and has appt later today.     He is going to drop back from 3 times weekly PT to 2 times weekly PT due to medicare coverage. He is doing well at PT and per wife is actually seeming to like it and participate in it at this point.     He does have complaints of some mild constipation. He does have straining with BMs that we wanted to work to reduce related to reported hx of inguinal hernia. He has miralax at home and only takes occasionally when he mentions to his wife that he is feeling constipated. She adds this to orange juice. Doesn't love the powder but he does take it. They do say it is hard to remember and hard to include the powder because it is something so different to take, not a pill. Does say he has more often trouble with BMs than not.   We decided to change the miralax powder to capsules of metamucil so it can be added to his pill boxes and included in the day more effortlessly. Discussed titration.     As far as the inguinal hernia, there seemed to be very small defect in the left inguinal canal without contents and no change with valsalva. He had a little discomfort with the exam but overall not painful. No further work up or referrals at this time. We will work on the constipation to keep intra-abdominal pressure down. Agreeable to this. He and his wife are not really wanting to see a surgeon if they can avoid it.     His blood pressure is well controlled. We have been backing off on his metoprolol with cardiology to avoid pt being so tired during the day. Potentially a minor contributor but as we have discussed probably also with his age, common sleep pattern he has. His BP has tolerated reduction.      Pt did have question after the appointment  so I called him at home on 1/14/20 in the evening and spoke with pt and pt's wife at his request.   He was concerned about being sleepy during the day and then not sleeping well at night. Says he can dose off easily while sitting in his chair at home during the day. Then at night he goes to bed for a few hours and is up and down the rest of the night. Trouble with staying asleep. Says he goes to bed between 11-12 and then he is up between 2-3 am. Up and down after that. Usually out of the bed and then sleeps in the chair then back to the bed. This can also disrupt the wife's sleep. He does not take anything as far as medication to help with sleep. We have talked about this several times before and how to some extent his sleep pattern though frustrating is normal for his age. I recommended that he try to be active during the day to avoid sitting and dosing. We discussed different hobbies but he really does not have interest in puzzles or reading or games. Outside of his PT days he does not have a lot of activity but also his PT days the sleep pattern is not much different. Additionally we discussed that alcohol can have a negative effect on his sleep. He does drink one cocktail before dinner and one after. He would like to have 3 drinks per night but his wife would like him to have none so they compromise and she fixes them and measures out 2 ounces for each drink. He does this nightly.   I recommended including a low dose of melatonin 1 mg nightly around 9 pm. They are agreeable to try this and will add it in. The home is that he will stay asleep a little longer at night. Unlikely to have major change but some improvement would be good. If not helping after a few nights can double, after that can triple. Then let me know how it is going.   This was discussed with pt and his wife and wife repeated back instructions for administration. They are going to look for this over the counter and start it.     He is due for  lab work today to check in on kidney function, electrolytes, liver labs and blood counts. He is agreeable.

## 2020-01-13 NOTE — PROGRESS NOTES
Subjective:     Encounter Date: 01/13/20        Patient ID: Payam Campos is a 88 y.o. male.    Chief Complaint: SSS, CAF  History of Present Illness    Dear Dr. Walker,    I had the pleasure seeing this patient in the office today for f/u.    He is a pleasant gentleman who previously had been followed by Dr. Khna at [facility].  He has a history of chronic atrial fibrillation, sick sinus syndrome, pacemaker in place, as well as CAD with prior STEMI (2006)and stents placed in his LAD.  Followed in our device clinic and was most recently seen December 2018 (Nubefy model number L3 1 1, serial #[id number]; this was placed 11/2018).    Generally he has been doing well.  He is in a wheelchair.  He has not fallen.  He has been complaining of generalized fatigue.  He had been on metoprolol tartrate 50 mg twice daily and this was decreased to 25 mg twice daily.  He still feels fatigued.  He has not seen any increase in heart rate with that change in his metoprolol.  No chest pain or chest discomfort.  No shortness of breath.  No palpitations or tachycardia.  No lower extremity edema.    In 2006 he had an ST segment elevation myocardial infarction and stent placement.  In 2009 he had non-ST segment elevation MI in the setting of complete heart block.  In July 2013 he had a stress Cardiolite performed that showed small defect in the inferolateral wall there was felt to be of very low risk study.  Ejection fraction was 64%.     He has been showing increasing signs of dementia.    The following portions of the patient's history were reviewed and updated as appropriate: allergies, current medications, past family history, past medical history, past social history, past surgical history and problem list.    Past Medical History:   Diagnosis Date   • Abnormal glucose    • Allergic rhinitis    • Anxiety disorder    • Arteriosclerosis of coronary artery    • Ataxia    • Atrial fibrillation (CMS/HCC)    •  Atrioventricular block, complete (CMS/[facility])    • Basal cell carcinoma     left temple 11/2002, shoulder 10/2007   • CAD (coronary artery disease)    • Chest pain    • Chronic low back pain    • Conjunctivitis     left eye bacterial infection   • Constipation    • Coronary artery disease involving native heart without angina pectoris    • Depression    • Difficulty walking     leg weakness, coming on gradually for a year   • Earache, left    • Essential (primary) hypertension    • Excessive daytime sleepiness    • Falls    • Gait difficulty    • Heart disease    • High risk medication use    • Hyperlipidemia    • Hypertension    • Hypothyroidism    • Inguinal hernia    • Joint pain, knee    • Labyrinthitis    • Leg pain    • Leg weakness    • Long-term (current) use of anticoagulants    • Lumbar spondylosis    • Myocardial infarction (CMS/[facility])     STEMI, 2006 NSTEMI 2009   • NSTEMI (non-ST elevated myocardial infarction) (CMS/[facility]) 2009   • Obesity    • Overweight    • Pacemaker     dual-chamber, [employer]   • Rash    • Skin cancer     right shoulder resected 10/07 and left temple resected 11/02   • Sleep disturbance    • SOB (shortness of breath)    • STEMI (ST elevation myocardial infarction) (CMS/[facility]) 2005   • Weakness     progressive, making it difficult to walk       Past Surgical History:   Procedure Laterality Date   • CARDIAC ELECTROPHYSIOLOGY PROCEDURE N/A 11/5/2018    Procedure: PPM battery change   [place];  Surgeon: Gamaliel Velasquez MD;  Location: [facility] INVASIVE LOCATION;  Service: Cardiology   • CORONARY ANGIOPLASTY WITH STENT PLACEMENT     • EYE SURGERY Left     tear duct   • PACEMAKER IMPLANTATION         Social History     Socioeconomic History   • Marital status:      Spouse name: Not on file   • Number of children: Not on file   • Years of education: Not on file   • Highest education level: Not on file   Tobacco Use   • Smoking status: Former Smoker   • Smokeless tobacco: Never  "Used   Substance and Sexual Activity   • Alcohol use: Yes     Comment: 4 oz of bourbon nightly   • Drug use: No   • Sexual activity: Defer       Review of Systems   Constitution: Negative for chills, decreased appetite, fever and night sweats.   HENT: Negative for ear discharge, ear pain, hearing loss, nosebleeds and sore throat.    Eyes: Negative for blurred vision, double vision and pain.   Cardiovascular: Negative for cyanosis.   Respiratory: Negative for hemoptysis and sputum production.    Endocrine: Negative for cold intolerance and heat intolerance.   Hematologic/Lymphatic: Negative for adenopathy.   Skin: Negative for dry skin, itching, nail changes, rash and suspicious lesions.   Musculoskeletal: Negative for arthritis, gout, muscle cramps, muscle weakness, myalgias and neck pain.   Gastrointestinal: Negative for anorexia, bowel incontinence, constipation, diarrhea, dysphagia, hematemesis and jaundice.   Genitourinary: Negative for bladder incontinence, dysuria, flank pain, frequency, hematuria and nocturia.   Neurological: Negative for focal weakness, numbness, paresthesias and seizures.   Psychiatric/Behavioral: Negative for altered mental status, hallucinations, hypervigilance, suicidal ideas and thoughts of violence.   Allergic/Immunologic: Negative for persistent infections.         ECG 12 Lead  Date/Time: 1/13/2020 2:24 PM  Performed by: Rajan Borden III, MD  Authorized by: Rajan Borden III, MD   Comparison: compared with previous ECG   Similar to previous ECG  Rhythm: paced  Rate: normal  Conduction: left bundle branch block  QRS axis: left  Other: no other findings  Pacing: ventricular pacing and 100% capture  Clinical impression: abnormal EKG               Objective:     Vitals:    01/13/20 1332   BP: 120/80   Pulse: 60   Weight: 90.7 kg (200 lb)   Height: 190.5 cm (75\")         Physical Exam   Constitutional: He is oriented to person, place, and time. He appears well-developed and " well-nourished. No distress.   HENT:   Head: Normocephalic and atraumatic.   Nose: Nose normal.   Mouth/Throat: Oropharynx is clear and moist.   Eyes: Pupils are equal, round, and reactive to light. Conjunctivae and EOM are normal. Right eye exhibits no discharge. Left eye exhibits no discharge.   Neck: Normal range of motion. Neck supple. No tracheal deviation present. No thyromegaly present.   Cardiovascular: Normal rate, regular rhythm, S1 normal, S2 normal, normal heart sounds and normal pulses. Exam reveals no S3.   Pulmonary/Chest: Effort normal and breath sounds normal. No stridor. No respiratory distress. He exhibits no tenderness.   Abdominal: Soft. Bowel sounds are normal. He exhibits no distension and no mass. There is no tenderness. There is no rebound and no guarding.   Musculoskeletal: Normal range of motion. He exhibits no tenderness or deformity.   Lymphadenopathy:     He has no cervical adenopathy.   Neurological: He is alert and oriented to person, place, and time. He has normal reflexes.   Skin: Skin is warm and dry. No rash noted. He is not diaphoretic. No erythema.   Psychiatric: He has a normal mood and affect. Thought content normal.       Lab Review:             Performed        Assessment:          Diagnosis Plan   1. Coronary artery disease involving native heart without angina pectoris, unspecified vessel or lesion type  metoprolol succinate XL (TOPROL-XL) 25 MG 24 hr tablet    ECG 12 Lead   2. Chronic atrial fibrillation  metoprolol succinate XL (TOPROL-XL) 25 MG 24 hr tablet    ECG 12 Lead   3. Pacemaker  metoprolol succinate XL (TOPROL-XL) 25 MG 24 hr tablet    ECG 12 Lead   4. Other hyperlipidemia  metoprolol succinate XL (TOPROL-XL) 25 MG 24 hr tablet    ECG 12 Lead   5. Permanent atrial fibrillation  metoprolol succinate XL (TOPROL-XL) 25 MG 24 hr tablet    ECG 12 Lead   6. Essential hypertension  ECG 12 Lead          Plan:       1.  Chronic atrial fibrillation-continue rate control,  he is on a VVIR pacing modality, I am going to decrease his metoprolol to metoprolol succinate 25 mg once daily  2.  CAD, prior PCI, details as above, no angina pectoris.  Remains on 81 mg aspirin.  3.  Sick sinus syndrome-status post pacemaker placement.  History of complete heart block.  Followed in our device clinic  4.  Chronic anticoagulation followed by yourself, currently on apixaban 5 mg twice daily  5.  We will arrange for a one year appointment as well.    Thank you very much for allowing us to participate in the care of this pleasant patient.  Please don't hesitate to call if I can be of assistance in any way.      Current Outpatient Medications:   •  acetaminophen (TYLENOL) 500 MG tablet, Take 500 mg by mouth 2 (Two) Times a Day., Disp: , Rfl:   •  apixaban (ELIQUIS) 5 MG tablet tablet, Take 1 tablet by mouth Every 12 (Twelve) Hours., Disp: 180 tablet, Rfl: 1  •  aspirin 81 MG tablet, Take 81 mg by mouth Daily., Disp: , Rfl:   •  docusate sodium (COLACE) 50 MG capsule, Take 250 mg by mouth Daily., Disp: , Rfl:   •  enalapril (VASOTEC) 5 MG tablet, TAKE 1 TABLET DAILY, Disp: 90 tablet, Rfl: 1  •  metoprolol tartrate (LOPRESSOR) 50 MG tablet, TAKE ONE AND ONE-HALF TABLETS EVERY 12 HOURS IN THE MORNING AND IN THE EVENING (Patient taking differently: TAKE ONE AND TABLETS EVERY 12 HOURS IN THE MORNING AND IN THE EVENING), Disp: 270 tablet, Rfl: 1  •  Multiple Vitamins-Minerals (ICAPS) capsule, Take 1 tablet by mouth Daily., Disp: , Rfl:   •  psyllium (METAMUCIL) 0.52 g capsule, Take 2 capsules by mouth 2 (Two) Times a Day., Disp: 360 capsule, Rfl: 1  •  vitamin B-12 (CYANOCOBALAMIN) 1000 MCG tablet, Take 1,000 mcg by mouth Daily., Disp: , Rfl:

## 2020-01-14 LAB
ALBUMIN SERPL-MCNC: 4.1 G/DL (ref 3.5–5.2)
ALBUMIN/GLOB SERPL: 1.6 G/DL
ALP SERPL-CCNC: 70 U/L (ref 39–117)
ALT SERPL-CCNC: 13 U/L (ref 1–41)
AST SERPL-CCNC: 19 U/L (ref 1–40)
BASOPHILS # BLD AUTO: 0.03 10*3/MM3 (ref 0–0.2)
BASOPHILS NFR BLD AUTO: 0.5 % (ref 0–1.5)
BILIRUB SERPL-MCNC: 1.6 MG/DL (ref 0.2–1.2)
BUN SERPL-MCNC: 19 MG/DL (ref 8–23)
BUN/CREAT SERPL: 18.6 (ref 7–25)
CALCIUM SERPL-MCNC: 9.5 MG/DL (ref 8.6–10.5)
CHLORIDE SERPL-SCNC: 103 MMOL/L (ref 98–107)
CO2 SERPL-SCNC: 27.5 MMOL/L (ref 22–29)
CREAT SERPL-MCNC: 1.02 MG/DL (ref 0.76–1.27)
EOSINOPHIL # BLD AUTO: 0.18 10*3/MM3 (ref 0–0.4)
EOSINOPHIL NFR BLD AUTO: 3.2 % (ref 0.3–6.2)
ERYTHROCYTE [DISTWIDTH] IN BLOOD BY AUTOMATED COUNT: 13.2 % (ref 12.3–15.4)
GLOBULIN SER CALC-MCNC: 2.6 GM/DL
GLUCOSE SERPL-MCNC: 97 MG/DL (ref 65–99)
HCT VFR BLD AUTO: 47.6 % (ref 37.5–51)
HGB BLD-MCNC: 16.4 G/DL (ref 13–17.7)
IMM GRANULOCYTES # BLD AUTO: 0.02 10*3/MM3 (ref 0–0.05)
IMM GRANULOCYTES NFR BLD AUTO: 0.4 % (ref 0–0.5)
LYMPHOCYTES # BLD AUTO: 0.65 10*3/MM3 (ref 0.7–3.1)
LYMPHOCYTES NFR BLD AUTO: 11.6 % (ref 19.6–45.3)
MCH RBC QN AUTO: 33.4 PG (ref 26.6–33)
MCHC RBC AUTO-ENTMCNC: 34.5 G/DL (ref 31.5–35.7)
MCV RBC AUTO: 96.9 FL (ref 79–97)
MONOCYTES # BLD AUTO: 0.6 10*3/MM3 (ref 0.1–0.9)
MONOCYTES NFR BLD AUTO: 10.7 % (ref 5–12)
NEUTROPHILS # BLD AUTO: 4.13 10*3/MM3 (ref 1.7–7)
NEUTROPHILS NFR BLD AUTO: 73.6 % (ref 42.7–76)
NRBC BLD AUTO-RTO: 0 /100 WBC (ref 0–0.2)
PLATELET # BLD AUTO: 161 10*3/MM3 (ref 140–450)
POTASSIUM SERPL-SCNC: 4.4 MMOL/L (ref 3.5–5.2)
PROT SERPL-MCNC: 6.7 G/DL (ref 6–8.5)
RBC # BLD AUTO: 4.91 10*6/MM3 (ref 4.14–5.8)
SODIUM SERPL-SCNC: 142 MMOL/L (ref 136–145)
WBC # BLD AUTO: 5.61 10*3/MM3 (ref 3.4–10.8)

## 2020-01-24 ENCOUNTER — ANTICOAGULATION VISIT (OUTPATIENT)
Dept: FAMILY MEDICINE CLINIC | Facility: CLINIC | Age: 85
End: 2020-01-24

## 2020-02-03 RX ORDER — METOPROLOL TARTRATE 50 MG/1
TABLET, FILM COATED ORAL
Qty: 270 TABLET | Refills: 1 | Status: SHIPPED | OUTPATIENT
Start: 2020-02-03 | End: 2020-07-24 | Stop reason: DRUGHIGH

## 2020-03-03 RX ORDER — APIXABAN 5 MG/1
TABLET, FILM COATED ORAL
Qty: 180 TABLET | Refills: 1 | Status: SHIPPED | OUTPATIENT
Start: 2020-03-03 | End: 2020-08-31 | Stop reason: SDUPTHER

## 2020-03-17 ENCOUNTER — CLINICAL SUPPORT NO REQUIREMENTS (OUTPATIENT)
Dept: CARDIOLOGY | Facility: CLINIC | Age: 85
End: 2020-03-17

## 2020-03-17 DIAGNOSIS — I48.20 CHRONIC ATRIAL FIBRILLATION (HCC): Primary | ICD-10-CM

## 2020-03-17 PROCEDURE — 93296 REM INTERROG EVL PM/IDS: CPT | Performed by: INTERNAL MEDICINE

## 2020-03-17 PROCEDURE — 93294 REM INTERROG EVL PM/LDLS PM: CPT | Performed by: INTERNAL MEDICINE

## 2020-03-30 RX ORDER — ENALAPRIL MALEATE 5 MG/1
TABLET ORAL
Qty: 90 TABLET | Refills: 1 | Status: SHIPPED | OUTPATIENT
Start: 2020-03-30 | End: 2020-09-28

## 2020-07-13 ENCOUNTER — TELEPHONE (OUTPATIENT)
Dept: FAMILY MEDICINE CLINIC | Facility: CLINIC | Age: 85
End: 2020-07-13

## 2020-07-13 NOTE — TELEPHONE ENCOUNTER
PATIENTS WIFE WAS CALLING TO SEE IF SHE NEEDED TO BRING IN HER  TO THE OFFICE FOR HIS APPOINTMENT ON 7-16-20.     WIFE NEEDS TO KNOW TODAY IF SHE WANTS HIM TO KEEP THE IN OFFICE VISIT AS SCHEDULED SO SHE CAN ARRANGE TRANSPORTATION.     WIFE GUIDO CALL BACK NUMBER [phone]

## 2020-09-08 ENCOUNTER — TELEPHONE (OUTPATIENT)
Dept: CARDIOLOGY | Facility: CLINIC | Age: 85
End: 2020-09-08

## 2020-09-08 NOTE — TELEPHONE ENCOUNTER
EDMUNDO  On 5/6/2020 patient had an event of reported vt lasting 16 beats rate of 183 bpm.  Thanks Sherlyn Plata

## 2020-09-14 ENCOUNTER — OFFICE VISIT (OUTPATIENT)
Dept: CARDIOLOGY | Facility: CLINIC | Age: 85
End: 2020-09-14

## 2020-09-14 VITALS
WEIGHT: 200 LBS | DIASTOLIC BLOOD PRESSURE: 70 MMHG | SYSTOLIC BLOOD PRESSURE: 117 MMHG | HEART RATE: 60 BPM | HEIGHT: 74 IN | BODY MASS INDEX: 25.67 KG/M2

## 2020-09-14 DIAGNOSIS — I48.20 CHRONIC ATRIAL FIBRILLATION (HCC): Chronic | ICD-10-CM

## 2020-09-14 DIAGNOSIS — Z95.0 PACEMAKER: Chronic | ICD-10-CM

## 2020-09-14 DIAGNOSIS — I47.29 NSVT (NONSUSTAINED VENTRICULAR TACHYCARDIA) (HCC): Chronic | ICD-10-CM

## 2020-09-14 DIAGNOSIS — E78.49 OTHER HYPERLIPIDEMIA: Chronic | ICD-10-CM

## 2020-09-14 DIAGNOSIS — I25.10 CORONARY ARTERY DISEASE INVOLVING NATIVE HEART WITHOUT ANGINA PECTORIS, UNSPECIFIED VESSEL OR LESION TYPE: Primary | Chronic | ICD-10-CM

## 2020-09-14 DIAGNOSIS — F03.90 DEMENTIA WITHOUT BEHAVIORAL DISTURBANCE, UNSPECIFIED DEMENTIA TYPE: Chronic | ICD-10-CM

## 2020-09-14 PROCEDURE — 99442 PR PHYS/QHP TELEPHONE EVALUATION 11-20 MIN: CPT | Performed by: INTERNAL MEDICINE

## 2020-09-14 NOTE — PROGRESS NOTES
Subjective:     Encounter Date: 09/14/20        Patient ID: Payam Campos is a 89 y.o. male.    Chief Complaint: SSS, CAF  History of Present Illness    Dear Dr. Walker,    This patient has consented to a telehealth visit via 6. The visit was scheduled as a audio visit to comply with patient safety concerns in accordance with [facility] recommendations.  All vitals recorded within this visit are reported by the patient.  I spent  audio minutes in total including but not limited to the 12 minutes spent in direct conversation with this patient.       He is a pleasant gentleman who previously had been followed by Dr. Khan at [facility].  He has a history of chronic atrial fibrillation, sick sinus syndrome, pacemaker in place, as well as CAD with prior STEMI (2006)and stents placed in his LAD.  Followed in our device clinic and was most recently seen December 2018 ([employer] model number L3 1 1, serial #[id number]; this was placed 11/2018).    We have an audio visit today simply to follow-up on his cardiac status.  He states that he has been doing well.  He denies any chest pain or chest discomfort.  He has not had any dizziness or lightheadedness.  He recently had his device interrogated and this showed a single episode of nonsustained ventricular tachycardia in May.  He had no symptoms.  Other than that nothing else.  He remains on his current dose of metoprolol.    In 2006 he had an ST segment elevation myocardial infarction and stent placement.  In 2009 he had non-ST segment elevation MI in the setting of complete heart block.  In July 2013 he had a stress Cardiolite performed that showed small defect in the inferolateral wall there was felt to be of very low risk study.  Ejection fraction was 64%.     He has been showing increasing signs of dementia.    The following portions of the patient's history were reviewed and updated as appropriate: allergies, current medications, past family history,  past medical history, past social history, past surgical history and problem list.    Past Medical History:   Diagnosis Date   • Abnormal glucose    • Allergic rhinitis    • Anxiety disorder    • Arteriosclerosis of coronary artery    • Ataxia    • Atrial fibrillation (CMS/[facility])    • Atrioventricular block, complete (CMS/[facility])    • Basal cell carcinoma     left temple 11/2002, shoulder 10/2007   • CAD (coronary artery disease)    • Chest pain    • Chronic low back pain    • Conjunctivitis     left eye bacterial infection   • Constipation    • Coronary artery disease involving native heart without angina pectoris    • Depression    • Difficulty walking     leg weakness, coming on gradually for a year   • Earache, left    • Essential (primary) hypertension    • Excessive daytime sleepiness    • Falls    • Gait difficulty    • Heart disease    • High risk medication use    • Hyperlipidemia    • Hypertension    • Hypothyroidism    • Inguinal hernia    • Joint pain, knee    • Labyrinthitis    • Leg pain    • Leg weakness    • Long-term (current) use of anticoagulants    • Lumbar spondylosis    • Myocardial infarction (CMS/[facility])     STEMI, 2006 NSTEMI 2009   • NSTEMI (non-ST elevated myocardial infarction) (CMS/[facility]) 2009   • Obesity    • Overweight    • Pacemaker     dual-chamber, [employer]   • Rash    • Skin cancer     right shoulder resected 10/07 and left temple resected 11/02   • Sleep disturbance    • SOB (shortness of breath)    • STEMI (ST elevation myocardial infarction) (CMS/[facility]) 2005   • Weakness     progressive, making it difficult to walk       Past Surgical History:   Procedure Laterality Date   • CARDIAC ELECTROPHYSIOLOGY PROCEDURE N/A 11/5/2018    Procedure: PPM battery change   [place];  Surgeon: Gamaliel Velasquez MD;  Location: [facility] INVASIVE LOCATION;  Service: Cardiology   • CORONARY ANGIOPLASTY WITH STENT PLACEMENT     • EYE SURGERY Left     tear duct   • PACEMAKER IMPLANTATION    "      Social History     Socioeconomic History   • Marital status:      Spouse name: Not on file   • Number of children: Not on file   • Years of education: Not on file   • Highest education level: Not on file   Tobacco Use   • Smoking status: Former Smoker   • Smokeless tobacco: Never Used   Substance and Sexual Activity   • Alcohol use: Yes     Comment: 4 oz of bourbon nightly   • Drug use: No   • Sexual activity: Defer       Review of Systems   Constitution: Negative for chills, decreased appetite, fever and night sweats.   HENT: Negative for ear discharge, ear pain, hearing loss, nosebleeds and sore throat.    Eyes: Negative for blurred vision, double vision and pain.   Cardiovascular: Negative for cyanosis.   Respiratory: Negative for hemoptysis and sputum production.    Endocrine: Negative for cold intolerance and heat intolerance.   Hematologic/Lymphatic: Negative for adenopathy.   Skin: Negative for dry skin, itching, nail changes, rash and suspicious lesions.   Musculoskeletal: Negative for arthritis, gout, muscle cramps, muscle weakness, myalgias and neck pain.   Gastrointestinal: Negative for anorexia, bowel incontinence, constipation, diarrhea, dysphagia, hematemesis and jaundice.   Genitourinary: Negative for bladder incontinence, dysuria, flank pain, frequency, hematuria and nocturia.   Neurological: Negative for focal weakness, numbness, paresthesias and seizures.   Psychiatric/Behavioral: Negative for altered mental status, hallucinations, hypervigilance, suicidal ideas and thoughts of violence.   Allergic/Immunologic: Negative for persistent infections.       Procedures       Objective:     Vitals:    09/14/20 1404   BP: 117/70   Pulse: 60   Weight: 90.7 kg (200 lb)   Height: 188 cm (74\")        Alert and oriented x3, thought processes normal, judgment normal, speaking in full sentences with no wheezing and no respiratory distress. Hearing is appropriate without difficulty.  Lab Review:     "         Performed        Assessment:          Diagnosis Plan   1. Coronary artery disease involving native heart without angina pectoris, unspecified vessel or lesion type     2. Chronic atrial fibrillation (CMS/HCC)     3. Pacemaker     4. NSVT (nonsustained ventricular tachycardia) (CMS/[facility])     5. Other hyperlipidemia     6. Dementia without behavioral disturbance, unspecified dementia type (CMS/[facility])            Plan:       1.  Chronic atrial fibrillation-continue rate control, he is on a VVIR pacing modality, continue beta-blockade, good rate control  2.  CAD, prior PCI, details as above, no angina pectoris.  Remains on 81 mg aspirin.  3.  Sick sinus syndrome-status post pacemaker placement.  History of complete heart block.  Followed in our device clinic  4.  Chronic anticoagulation followed by yourself, currently on apixaban 5 mg twice daily  5.  Nonsustained ventricular tachycardia-single episode, asymptomatic, continue beta-blockade  6.  Dementia-continues to follow with you regarding this.    Thank you very much for allowing us to participate in the care of this pleasant patient.  Please don't hesitate to call if I can be of assistance in any way.      Current Outpatient Medications:   •  apixaban (Eliquis) 5 MG tablet tablet, Take 1 tablet by mouth Every 12 (Twelve) Hours., Disp: 180 tablet, Rfl: 1  •  aspirin 81 MG tablet, Take 81 mg by mouth Daily., Disp: , Rfl:   •  docusate sodium (COLACE) 50 MG capsule, Take 50 mg by mouth Daily., Disp: , Rfl:   •  enalapril (VASOTEC) 5 MG tablet, TAKE 1 TABLET DAILY, Disp: 90 tablet, Rfl: 1  •  metoprolol succinate XL (TOPROL-XL) 25 MG 24 hr tablet, Take 1 tablet by mouth Daily., Disp: 90 tablet, Rfl: 3  •  Multiple Vitamins-Minerals (ICAPS) capsule, Take 1 tablet by mouth Daily., Disp: , Rfl:   •  psyllium (METAMUCIL) 0.52 g capsule, Take 2 capsules by mouth 2 (Two) Times a Day., Disp: 360 capsule, Rfl: 1  •  vitamin B-12 (CYANOCOBALAMIN) 1000 MCG tablet, Take 1,000  mcg by mouth Daily., Disp: , Rfl:

## 2020-09-28 RX ORDER — ENALAPRIL MALEATE 5 MG/1
TABLET ORAL
Qty: 90 TABLET | Refills: 1 | Status: SHIPPED | OUTPATIENT
Start: 2020-09-28 | End: 2021-03-25

## 2021-01-04 DIAGNOSIS — I48.20 CHRONIC ATRIAL FIBRILLATION (HCC): Chronic | ICD-10-CM

## 2021-01-04 DIAGNOSIS — I25.10 CORONARY ARTERY DISEASE INVOLVING NATIVE HEART WITHOUT ANGINA PECTORIS, UNSPECIFIED VESSEL OR LESION TYPE: Chronic | ICD-10-CM

## 2021-01-04 DIAGNOSIS — I48.21 PERMANENT ATRIAL FIBRILLATION (HCC): ICD-10-CM

## 2021-01-04 DIAGNOSIS — Z95.0 PACEMAKER: Chronic | ICD-10-CM

## 2021-01-04 DIAGNOSIS — E78.49 OTHER HYPERLIPIDEMIA: Chronic | ICD-10-CM

## 2021-01-05 RX ORDER — METOPROLOL SUCCINATE 25 MG/1
TABLET, EXTENDED RELEASE ORAL
Qty: 90 TABLET | Refills: 2 | Status: SHIPPED | OUTPATIENT
Start: 2021-01-05 | End: 2021-10-04 | Stop reason: SDUPTHER

## 2021-02-08 RX ORDER — NITROGLYCERIN 0.4 MG/1
0.4 TABLET SUBLINGUAL
Qty: 25 TABLET | Refills: 0 | Status: SHIPPED | OUTPATIENT
Start: 2021-02-08

## 2021-02-28 RX ORDER — APIXABAN 5 MG/1
TABLET, FILM COATED ORAL
Qty: 180 TABLET | Refills: 1 | Status: SHIPPED | OUTPATIENT
Start: 2021-02-28 | End: 2021-08-04

## 2021-03-19 PROCEDURE — 93294 REM INTERROG EVL PM/LDLS PM: CPT | Performed by: INTERNAL MEDICINE

## 2021-03-19 PROCEDURE — 93296 REM INTERROG EVL PM/IDS: CPT | Performed by: INTERNAL MEDICINE

## 2021-03-25 RX ORDER — ENALAPRIL MALEATE 5 MG/1
TABLET ORAL
Qty: 90 TABLET | Refills: 3 | Status: SHIPPED | OUTPATIENT
Start: 2021-03-25 | End: 2021-10-22 | Stop reason: SDUPTHER

## 2021-06-18 PROCEDURE — 93294 REM INTERROG EVL PM/LDLS PM: CPT | Performed by: INTERNAL MEDICINE

## 2021-06-18 PROCEDURE — 93296 REM INTERROG EVL PM/IDS: CPT | Performed by: INTERNAL MEDICINE

## 2021-07-12 ENCOUNTER — TELEPHONE (OUTPATIENT)
Dept: FAMILY MEDICINE CLINIC | Facility: CLINIC | Age: 86
End: 2021-07-12

## 2021-07-12 DIAGNOSIS — Z02.2 ENCOUNTER FOR EXAMINATION FOR ADMISSION TO NURSING HOME: Primary | ICD-10-CM

## 2021-07-12 NOTE — TELEPHONE ENCOUNTER
He will be moving into [facility] per son, and they need a form filled out.. The patients daughter will be dropping the forms off later today. When they are ready please call the son at [phone], or if you have any questions. [facility] does require a chest x-ray before they will accept him. Can you place an order for this?

## 2021-07-13 ENCOUNTER — TELEPHONE (OUTPATIENT)
Dept: FAMILY MEDICINE CLINIC | Facility: CLINIC | Age: 86
End: 2021-07-13

## 2021-07-13 NOTE — TELEPHONE ENCOUNTER
Caller: Katy JEAN BAPTISTE    Relationship: Emergency Contact    Best call back number: [phone] (M)    What is the best time to reach you: ANYTIME    Who are you requesting to speak with (clinical staff, provider,  specific staff member): DR LOBATO    What was the call regarding:  DAUGHTER CALLING WANTING TO VERIFY IF AN ORDER FOR A TB TEST HAS BEEN PLACED      Do you require a callback: YES

## 2021-07-14 ENCOUNTER — LAB (OUTPATIENT)
Dept: LAB | Facility: HOSPITAL | Age: 86
End: 2021-07-14

## 2021-07-14 ENCOUNTER — HOSPITAL ENCOUNTER (OUTPATIENT)
Dept: GENERAL RADIOLOGY | Facility: HOSPITAL | Age: 86
Discharge: HOME | End: 2021-07-14

## 2021-07-14 DIAGNOSIS — Z02.2 ENCOUNTER FOR EXAMINATION FOR ADMISSION TO NURSING HOME: ICD-10-CM

## 2021-07-14 PROCEDURE — 71046 X-RAY EXAM CHEST 2 VIEWS: CPT

## 2021-07-14 PROCEDURE — 86480 TB TEST CELL IMMUN MEASURE: CPT | Performed by: FAMILY MEDICINE

## 2021-07-19 ENCOUNTER — TELEPHONE (OUTPATIENT)
Dept: FAMILY MEDICINE CLINIC | Facility: CLINIC | Age: 86
End: 2021-07-19

## 2021-07-19 NOTE — TELEPHONE ENCOUNTER
Regarding paperwork I have to complete for their transition in care level and facilities I discussed with pt and his wife code status and pt would like to be full code.   His CXR reveal pleural effusion on the left. There are no CXRs for comparison. I asked pt about his breathing, ANTHONY, and cough. He denies any trouble with breathing, getting around in the home or cough. Wife agreed that pt is not having any concerning or new symptoms.   I discussed the x ray findings with his wife. I am recommended repeat x ray in 4 weeks to follow.   Will send report with his paper work.

## 2021-08-04 RX ORDER — APIXABAN 5 MG/1
TABLET, FILM COATED ORAL
Qty: 180 TABLET | Refills: 1 | Status: SHIPPED | OUTPATIENT
Start: 2021-08-04 | End: 2022-01-31

## 2021-08-04 NOTE — TELEPHONE ENCOUNTER
Rx Refill Note  Requested Prescriptions     Pending Prescriptions Disp Refills   • Eliquis 5 MG tablet tablet [Pharmacy Med Name: ELIQUIS TABS 5MG] 180 tablet 3     Sig: TAKE 1 TABLET EVERY 12 HOURS      Last office visit with prescribing clinician: 7/24/2020      Next office visit with prescribing clinician: Visit date not found            Jose Rivas MA  08/04/21, 08:15 EDT    Pt just recently changed into assisted living facility where they have been recently put on lock down, would you like me to set up phone visit?     Wifes # [phone]

## 2021-09-17 PROCEDURE — 93296 REM INTERROG EVL PM/IDS: CPT | Performed by: INTERNAL MEDICINE

## 2021-09-17 PROCEDURE — 93294 REM INTERROG EVL PM/LDLS PM: CPT | Performed by: INTERNAL MEDICINE

## 2021-10-01 DIAGNOSIS — I48.21 PERMANENT ATRIAL FIBRILLATION (HCC): ICD-10-CM

## 2021-10-01 DIAGNOSIS — Z95.0 PACEMAKER: Chronic | ICD-10-CM

## 2021-10-01 DIAGNOSIS — I25.10 CORONARY ARTERY DISEASE INVOLVING NATIVE HEART WITHOUT ANGINA PECTORIS, UNSPECIFIED VESSEL OR LESION TYPE: Chronic | ICD-10-CM

## 2021-10-01 DIAGNOSIS — I48.20 CHRONIC ATRIAL FIBRILLATION (HCC): Chronic | ICD-10-CM

## 2021-10-01 DIAGNOSIS — E78.49 OTHER HYPERLIPIDEMIA: Chronic | ICD-10-CM

## 2021-10-04 ENCOUNTER — OFFICE VISIT (OUTPATIENT)
Dept: CARDIOLOGY | Facility: CLINIC | Age: 86
End: 2021-10-04

## 2021-10-04 VITALS
SYSTOLIC BLOOD PRESSURE: 126 MMHG | DIASTOLIC BLOOD PRESSURE: 65 MMHG | HEIGHT: 74 IN | BODY MASS INDEX: 26.18 KG/M2 | HEART RATE: 60 BPM | WEIGHT: 204 LBS

## 2021-10-04 DIAGNOSIS — Z95.0 PACEMAKER: Chronic | ICD-10-CM

## 2021-10-04 DIAGNOSIS — I10 ESSENTIAL HYPERTENSION: Chronic | ICD-10-CM

## 2021-10-04 DIAGNOSIS — I47.29 NSVT (NONSUSTAINED VENTRICULAR TACHYCARDIA) (HCC): Chronic | ICD-10-CM

## 2021-10-04 DIAGNOSIS — F03.90 DEMENTIA WITHOUT BEHAVIORAL DISTURBANCE, UNSPECIFIED DEMENTIA TYPE: Chronic | ICD-10-CM

## 2021-10-04 DIAGNOSIS — I48.21 PERMANENT ATRIAL FIBRILLATION (HCC): ICD-10-CM

## 2021-10-04 DIAGNOSIS — I48.20 CHRONIC ATRIAL FIBRILLATION (HCC): Chronic | ICD-10-CM

## 2021-10-04 DIAGNOSIS — I25.10 CORONARY ARTERY DISEASE INVOLVING NATIVE HEART WITHOUT ANGINA PECTORIS, UNSPECIFIED VESSEL OR LESION TYPE: Primary | Chronic | ICD-10-CM

## 2021-10-04 DIAGNOSIS — E78.49 OTHER HYPERLIPIDEMIA: Chronic | ICD-10-CM

## 2021-10-04 PROCEDURE — 99442 PR PHYS/QHP TELEPHONE EVALUATION 11-20 MIN: CPT | Performed by: INTERNAL MEDICINE

## 2021-10-04 RX ORDER — METOPROLOL SUCCINATE 25 MG/1
25 TABLET, EXTENDED RELEASE ORAL DAILY
Qty: 90 TABLET | Refills: 3 | Status: SHIPPED | OUTPATIENT
Start: 2021-10-04

## 2021-10-04 RX ORDER — METOPROLOL SUCCINATE 25 MG/1
TABLET, EXTENDED RELEASE ORAL
Qty: 90 TABLET | Refills: 2 | OUTPATIENT
Start: 2021-10-04

## 2021-10-04 NOTE — PROGRESS NOTES
Subjective:     Encounter Date: 10/04/21        Patient ID: Payam Campos is a 89 y.o. male.    Chief Complaint: SSS, CAF  History of Present Illness    Dear Dr. Walker,    This patient has consented to a telehealth visit via audio. The visit was scheduled as a audio visit to comply with patient safety concerns in accordance with [facility] recommendations.  All vitals recorded within this visit are reported by the patient.  I spent 11 minutes in total including but not limited to the 15 minutes spent in direct conversation with this patient.     Overall he has been doing well without complaints.  Dementia has been more of an issue.  No chest pain or chest discomfort.  No shortness of breath.  He is followed in device clinic.  He had a single episode of tachycardia on July 30.  He remains on metoprolol.    He is a pleasant gentleman who previously had been followed by Dr. Khan at [facility].  He has a history of chronic atrial fibrillation, sick sinus syndrome, pacemaker in place, as well as CAD with prior STEMI (2006)and stents placed in his LAD.  Followed in our device clinic and was most recently seen December 2018 ([employer] model number L3 1 1, serial #[id number]; this was placed 11/2018).    In 2006 he had an ST segment elevation myocardial infarction and stent placement.  In 2009 he had non-ST segment elevation MI in the setting of complete heart block.  In July 2013 he had a stress Cardiolite performed that showed small defect in the inferolateral wall there was felt to be of very low risk study.  Ejection fraction was 64%.     He has been showing increasing signs of dementia.    The following portions of the patient's history were reviewed and updated as appropriate: allergies, current medications, past family history, past medical history, past social history, past surgical history and problem list.    Past Medical History:   Diagnosis Date   • Abnormal glucose    • Allergic rhinitis     • Anxiety disorder    • Arteriosclerosis of coronary artery    • Ataxia    • Atrial fibrillation ([facility])    • Atrioventricular block, complete ([facility])    • Basal cell carcinoma     left temple 11/2002, shoulder 10/2007   • CAD (coronary artery disease)    • Chest pain    • Chronic low back pain    • Conjunctivitis     left eye bacterial infection   • Constipation    • Coronary artery disease involving native heart without angina pectoris    • Dementia ([facility]) 9/14/2020   • Depression    • Difficulty walking     leg weakness, coming on gradually for a year   • Earache, left    • Essential (primary) hypertension    • Excessive daytime sleepiness    • Falls    • Gait difficulty    • Heart disease    • High risk medication use    • Hyperlipidemia    • Hypertension    • Hypothyroidism    • Inguinal hernia    • Joint pain, knee    • Labyrinthitis    • Leg pain    • Leg weakness    • Long-term (current) use of anticoagulants    • Lumbar spondylosis    • Myocardial infarction ([facility])     STEMI, 2006 NSTEMI 2009   • NSTEMI (non-ST elevated myocardial infarction) ([facility]) 2009   • NSVT (nonsustained ventricular tachycardia) ([facility]) 9/14/2020   • Obesity    • Overweight    • Pacemaker     dual-chamber, [employer]   • Rash    • Skin cancer     right shoulder resected 10/07 and left temple resected 11/02   • Sleep disturbance    • SOB (shortness of breath)    • STEMI (ST elevation myocardial infarction) ([facility]) 2005   • Weakness     progressive, making it difficult to walk       Past Surgical History:   Procedure Laterality Date   • CARDIAC ELECTROPHYSIOLOGY PROCEDURE N/A 11/5/2018    Procedure: PPM battery change   [place];  Surgeon: Gamaliel Velasquez MD;  Location: [facility] INVASIVE LOCATION;  Service: Cardiology   • CORONARY ANGIOPLASTY WITH STENT PLACEMENT     • EYE SURGERY Left     tear duct   • PACEMAKER IMPLANTATION       Procedures       Objective:     Vitals:    10/04/21 1353   BP: 126/65   Pulse: 60   Weight: 92.5 kg  "(204 lb)   Height: 188 cm (74\")   Body mass index is 26.19 kg/m².       Alert and oriented x3, thought processes normal, judgment normal, speaking in full sentences with no wheezing and no respiratory distress. Hearing is appropriate without difficulty.  Lab Review:             Lab Results   Component Value Date    GLUCOSE 100 (H) 11/01/2018    BUN 19 01/13/2020    CREATININE 1.02 01/13/2020    EGFRIFNONA 69 01/13/2020    EGFRIFAFRI 84 01/13/2020    BCR 18.6 01/13/2020    K 4.4 01/13/2020    CO2 27.5 01/13/2020    CALCIUM 9.5 01/13/2020    PROTENTOTREF 6.7 01/13/2020    ALBUMIN 4.10 01/13/2020    LABIL2 1.6 01/13/2020    AST 19 01/13/2020    ALT 13 01/13/2020             Assessment:          Diagnosis Plan   1. Coronary artery disease involving native heart without angina pectoris, unspecified vessel or lesion type     2. Pacemaker     3. Other hyperlipidemia     4. Chronic atrial fibrillation (HCC)     5. Essential hypertension     6. NSVT (nonsustained ventricular tachycardia) ([facility])     7. Dementia without behavioral disturbance, unspecified dementia type ([facility])            Plan:       1.  Chronic atrial fibrillation-continue rate control, he is on a VVIR pacing modality, continue beta-blockade, good rate control  2.  CAD, prior PCI, details as above, no complaint of CP Remains on 81 mg aspirin.  3.  Sick sinus syndrome-status post pacemaker placement.  History of complete heart block.  Followed in our device clinic  4.  Chronic anticoagulation followed by yourself, currently on apixaban 5 mg twice daily  5.  Nonsustained ventricular tachycardia-single episode, asymptomatic, continue beta-blockade  6.  Dementia-continues to follow with you regarding this.    Thank you very much for allowing us to participate in the care of this pleasant patient.  Please don't hesitate to call if I can be of assistance in any way.      Current Outpatient Medications:   •  aspirin 81 MG tablet, Take 81 mg by mouth Daily., Disp: , Rfl: "   •  docusate sodium (COLACE) 50 MG capsule, Take 50 mg by mouth Daily., Disp: , Rfl:   •  Eliquis 5 MG tablet tablet, TAKE 1 TABLET EVERY 12 HOURS, Disp: 180 tablet, Rfl: 1  •  enalapril (VASOTEC) 5 MG tablet, TAKE 1 TABLET DAILY, Disp: 90 tablet, Rfl: 3  •  metoprolol succinate XL (TOPROL-XL) 25 MG 24 hr tablet, TAKE 1 TABLET BY MOUTH ONCE DAILY, Disp: 90 tablet, Rfl: 2  •  nitroglycerin (Nitrostat) 0.4 MG SL tablet, Place 1 tablet under the tongue Every 5 (Five) Minutes As Needed for Chest Pain. Take no more than 3 doses in 15 min. If pain is not improving call 911 go to ER, Disp: 25 tablet, Rfl: 0  •  vitamin B-12 (CYANOCOBALAMIN) 1000 MCG tablet, Take 1,000 mcg by mouth Daily., Disp: , Rfl:

## 2021-10-22 RX ORDER — ENALAPRIL MALEATE 5 MG/1
5 TABLET ORAL DAILY
Qty: 90 TABLET | Refills: 0 | Status: SHIPPED | OUTPATIENT
Start: 2021-10-22 | End: 2022-01-17

## 2021-10-22 NOTE — TELEPHONE ENCOUNTER
Caller: Debbie Campos    Relationship: Emergency Contact    Requested Prescriptions:   Requested Prescriptions     Pending Prescriptions Disp Refills   • enalapril (VASOTEC) 5 MG tablet 90 tablet 3     Sig: Take 1 tablet by mouth Daily.        Pharmacy where request should be sent:   [id number] [place], [place] - [address] - [phone]  - [phone] FX  [phone]    Best call back number: [phone]    Does the patient have less than a 3 day supply:  [x] Yes  [] No    Pretty Bourgeois Rep   10/22/21 09:14 EDT

## 2021-10-22 NOTE — TELEPHONE ENCOUNTER
He was just seen by cardiology a week ago as a televisit. Please call and see if they can do blood work at his facility. We should check in on his blood counts and kidney function because of the medications he is on. If he has had recent blood work outside of [facility] please request this.   Let's set up a visit for February with me. Thanks.

## 2021-10-22 NOTE — TELEPHONE ENCOUNTER
Rx Refill Note  Requested Prescriptions     Pending Prescriptions Disp Refills   • enalapril (VASOTEC) 5 MG tablet 90 tablet 3     Sig: Take 1 tablet by mouth Daily.      Last office visit with prescribing clinician: 7/24/2020      Next office visit with prescribing clinician: Visit date not found            Emma Lanier LPN  10/22/21, 09:41 EDT

## 2021-12-16 ENCOUNTER — TELEPHONE (OUTPATIENT)
Dept: FAMILY MEDICINE CLINIC | Facility: CLINIC | Age: 86
End: 2021-12-16

## 2021-12-16 NOTE — TELEPHONE ENCOUNTER
Caller: Debbie Campos    Relationship: Emergency Contact    Best call back number: [phone]     What is the best time to reach you: ANY TIME    Who are you requesting to speak with (clinical staff, provider,  specific staff member): CLINICAL    What was the call regarding: PATIENT'S WIFE CALLED SAYING HE'S HAVING A REALLY BAD ITCH ALL OVER HIS BODY. SHE SAID THEY HAVEN'T BEEN ABLE TO FIND ANYTHING TO HELP, AND WAS WANTING TO KNOW OF BENADRYL WOULD BE OKAY FOR HIM TO TAKE.   PLEASE CALL AND ADVISE    Do you require a callback: YES

## 2021-12-17 PROCEDURE — 93296 REM INTERROG EVL PM/IDS: CPT | Performed by: INTERNAL MEDICINE

## 2021-12-17 PROCEDURE — 93294 REM INTERROG EVL PM/LDLS PM: CPT | Performed by: INTERNAL MEDICINE

## 2021-12-17 NOTE — PROGRESS NOTES
Spoke with pt's wife. He is having itching for a couple of weeks.   Today he is not too bad.   He has dry skin  No rash apparent  Says he scratches so much he bleeds.   He has itching on arms, legs and back.   Asking about benadryl is it safe. She has tylenol PM. Cautioned benadryl can cause confusion, lower blood pressure, and be sedating. If use just do 1/2 tablet and watch for those things. We talked about why it might not be good to have lower blood pressure. Discussed this three times and wife voiced understanding.    I recommended vaseline to help the dry skin. Going to send triamcinolone to mix with vaseline and apply to legs, arms, back twice daily.

## 2021-12-17 NOTE — TELEPHONE ENCOUNTER
She has tried aveeno, calamine  Has had itching for weeks.  He feels ok right now.   Discussed with wife. Note is in chart.

## 2022-01-17 RX ORDER — ENALAPRIL MALEATE 5 MG/1
5 TABLET ORAL DAILY
Qty: 90 TABLET | Refills: 0 | Status: SHIPPED | OUTPATIENT
Start: 2022-01-17 | End: 2022-02-11

## 2022-01-31 RX ORDER — APIXABAN 5 MG/1
TABLET, FILM COATED ORAL
Qty: 180 TABLET | Refills: 3 | Status: SHIPPED | OUTPATIENT
Start: 2022-01-31

## 2022-02-07 NOTE — TELEPHONE ENCOUNTER
Rx Refill Note  Requested Prescriptions     Pending Prescriptions Disp Refills   • enalapril (VASOTEC) 5 MG tablet [Pharmacy Med Name: ENALAPRIL MALEATE TABS 5MG] 90 tablet 3     Sig: TAKE 1 TABLET DAILY      Last office visit with prescribing clinician: Visit date not found      Next office visit with prescribing clinician: Visit date not found       {TIP  Please add Last Relevant Lab Date if appropriate: 01/13/20    Judy Guaman MA  02/07/22, 09:59 EST

## 2022-02-07 NOTE — TELEPHONE ENCOUNTER
I have not seen him for a year and a half so I really need to see him before doing a year of medication. I did 90 just a few weeks ago. Pt was scheduled today and cancelled. Needs an appointment for this script.

## 2022-02-11 RX ORDER — ENALAPRIL MALEATE 5 MG/1
TABLET ORAL
Qty: 90 TABLET | Refills: 3 | Status: SHIPPED | OUTPATIENT
Start: 2022-02-11

## 2022-03-18 PROCEDURE — 93296 REM INTERROG EVL PM/IDS: CPT | Performed by: INTERNAL MEDICINE

## 2022-03-18 PROCEDURE — 93294 REM INTERROG EVL PM/LDLS PM: CPT | Performed by: INTERNAL MEDICINE

## 2022-05-19 ENCOUNTER — TELEPHONE (OUTPATIENT)
Dept: CARDIOLOGY | Facility: CLINIC | Age: 87
End: 2022-05-19

## 2022-05-19 NOTE — TELEPHONE ENCOUNTER
Spoke with patient's wife, she said the patient has been doing just fine. He has not been complaining of any symptoms. I told her the date and time this event happened and she said she will check with the patient when he wakes up and call us back if he states he had any symptoms from the episode.     Estrella Day RN  Triage [facility]

## 2022-05-19 NOTE — TELEPHONE ENCOUNTER
Please call patient and see if he was symptomatic from this brief episode.  He has a known history of this and if not symptomatic, we will just continue to monitor.

## 2022-09-16 PROCEDURE — 93294 REM INTERROG EVL PM/LDLS PM: CPT | Performed by: INTERNAL MEDICINE

## 2022-09-16 PROCEDURE — 93296 REM INTERROG EVL PM/IDS: CPT | Performed by: INTERNAL MEDICINE

## 2022-12-16 PROCEDURE — 93296 REM INTERROG EVL PM/IDS: CPT | Performed by: INTERNAL MEDICINE

## 2022-12-16 PROCEDURE — 93294 REM INTERROG EVL PM/LDLS PM: CPT | Performed by: INTERNAL MEDICINE

## 2023-03-17 PROCEDURE — 93296 REM INTERROG EVL PM/IDS: CPT | Performed by: INTERNAL MEDICINE

## 2023-03-17 PROCEDURE — 93294 REM INTERROG EVL PM/LDLS PM: CPT | Performed by: INTERNAL MEDICINE

## 2023-03-30 ENCOUNTER — TELEPHONE (OUTPATIENT)
Dept: CARDIOLOGY | Facility: CLINIC | Age: 88
End: 2023-03-30
Payer: MEDICARE

## 2023-03-30 NOTE — TELEPHONE ENCOUNTER
Patient with VVIR pacer, remote transmission reviewed today documented 2 NSVT events on 3/10/23, EGM's show NSVT 9-17 beats long with rate 172-205 BPM.     Patient does have a history of asymptomatic NSVT. I spoke with patients wife today and she states her  hasn't had any complaints.     It has been over 3 years since patient has had in-office device check and the last provider appt. was on 10/4/21. Per patients wife her  rarely leaves their home. Patients son will be in town on 4/7/23 and can bring him to the office for a device check scheduled @ 1:30pm. Is their a provider that could see the patient on the same day. Thanks    NSVT on 3/10/23:

## 2023-04-07 ENCOUNTER — CLINICAL SUPPORT NO REQUIREMENTS (OUTPATIENT)
Dept: CARDIOLOGY | Facility: CLINIC | Age: 88
End: 2023-04-07
Payer: MEDICARE

## 2023-04-07 DIAGNOSIS — I45.9 HEART BLOCK: Primary | ICD-10-CM

## 2023-04-07 PROCEDURE — 93279 PRGRMG DEV EVAL PM/LDLS PM: CPT | Performed by: STUDENT IN AN ORGANIZED HEALTH CARE EDUCATION/TRAINING PROGRAM

## 2023-04-07 PROCEDURE — 93279 PRGRMG DEV EVAL PM/LDLS PM: CPT | Performed by: INTERNAL MEDICINE

## 2023-12-08 ENCOUNTER — TELEPHONE (OUTPATIENT)
Age: 88
End: 2023-12-08
Payer: MEDICARE

## 2023-12-08 NOTE — TELEPHONE ENCOUNTER
CALLED PT LVM TO VERIFY IF HMO OR PPO, PTS WIFE COULDN'T TALK AND REQUESTED CALL BACK, BUT PTS WIFE IS AWARE.

## 2024-04-03 ENCOUNTER — TELEPHONE (OUTPATIENT)
Dept: CARDIOLOGY | Facility: CLINIC | Age: 89
End: 2024-04-03

## 2024-04-03 NOTE — TELEPHONE ENCOUNTER
Caller: Debbie Campos    Relationship to patient: Emergency Contact    Best call back number:  [phone]    Patient is needing: PATIENTS WIFE CALLED REGARDING PATIENT. SHE STATED HE IS DOING WELL AND AS THIS MOMENT HAS NO NEED TO BE SEEN . IF THIS CHANGES THE NURSES AT HIS FACILITY WILL CALL.

## (undated) DEVICE — ELECTRD ECG CARBON/SNP RL FM A/ 5PK

## (undated) DEVICE — LOU PACE DEFIB: Brand: MEDLINE INDUSTRIES, INC.

## (undated) DEVICE — LIMB HOLDER, WRIST/ANKLE: Brand: DEROYAL

## (undated) DEVICE — Device

## (undated) DEVICE — PLASMABLADE PS200-040 4.0: Brand: PLASMABLADE™

## (undated) DEVICE — BIDIRECTIONAL TORQUE WRENCH NO2: Brand: MODEL 6942 BIDIRECTIONAL TORQUE WRENCH

## (undated) DEVICE — AIRLIFE™ NASAL OXYGEN CANNULA CURVED, NONFLARED TIP WITH 21 FOOT (6.4 M) CRUSH-RESISTANT TUBING, OVER-THE-EAR STYLE: Brand: AIRLIFE™